# Patient Record
Sex: MALE | Race: OTHER | NOT HISPANIC OR LATINO | URBAN - METROPOLITAN AREA
[De-identification: names, ages, dates, MRNs, and addresses within clinical notes are randomized per-mention and may not be internally consistent; named-entity substitution may affect disease eponyms.]

---

## 2019-02-20 ENCOUNTER — INPATIENT (INPATIENT)
Facility: HOSPITAL | Age: 39
LOS: 4 days | Discharge: HOME | DRG: 432 | End: 2019-02-25
Attending: INTERNAL MEDICINE | Admitting: INTERNAL MEDICINE
Payer: COMMERCIAL

## 2019-02-20 VITALS
OXYGEN SATURATION: 100 % | DIASTOLIC BLOOD PRESSURE: 55 MMHG | TEMPERATURE: 97 F | HEART RATE: 130 BPM | SYSTOLIC BLOOD PRESSURE: 89 MMHG | RESPIRATION RATE: 17 BRPM

## 2019-02-20 LAB
ALBUMIN SERPL ELPH-MCNC: 3.3 G/DL — LOW (ref 3.4–5)
ALP SERPL-CCNC: 45 U/L — SIGNIFICANT CHANGE UP (ref 40–120)
ALT FLD-CCNC: 23 U/L — SIGNIFICANT CHANGE UP (ref 12–42)
ANION GAP SERPL CALC-SCNC: 11 MMOL/L — SIGNIFICANT CHANGE UP (ref 9–16)
APTT BLD: 27.8 SEC — SIGNIFICANT CHANGE UP (ref 27.5–36.3)
AST SERPL-CCNC: 19 U/L — SIGNIFICANT CHANGE UP (ref 15–37)
BASOPHILS NFR BLD AUTO: 0.4 % — SIGNIFICANT CHANGE UP (ref 0–2)
BILIRUB SERPL-MCNC: 1.5 MG/DL — HIGH (ref 0.2–1.2)
BUN SERPL-MCNC: 37 MG/DL — HIGH (ref 7–23)
CALCIUM SERPL-MCNC: 8.5 MG/DL — SIGNIFICANT CHANGE UP (ref 8.5–10.5)
CHLORIDE SERPL-SCNC: 105 MMOL/L — SIGNIFICANT CHANGE UP (ref 96–108)
CO2 SERPL-SCNC: 23 MMOL/L — SIGNIFICANT CHANGE UP (ref 22–31)
CREAT SERPL-MCNC: 0.95 MG/DL — SIGNIFICANT CHANGE UP (ref 0.5–1.3)
EOSINOPHIL NFR BLD AUTO: 2.3 % — SIGNIFICANT CHANGE UP (ref 0–6)
GLUCOSE SERPL-MCNC: 148 MG/DL — HIGH (ref 70–99)
HCT VFR BLD CALC: 35.3 % — LOW (ref 39–50)
HGB BLD-MCNC: 11.7 G/DL — LOW (ref 13–17)
IMM GRANULOCYTES NFR BLD AUTO: 1 % — SIGNIFICANT CHANGE UP (ref 0–1.5)
INR BLD: 1.2 — HIGH (ref 0.88–1.16)
LYMPHOCYTES # BLD AUTO: 11.9 % — LOW (ref 13–44)
MCHC RBC-ENTMCNC: 31.9 PG — SIGNIFICANT CHANGE UP (ref 27–34)
MCHC RBC-ENTMCNC: 33.1 G/DL — SIGNIFICANT CHANGE UP (ref 32–36)
MCV RBC AUTO: 96.2 FL — SIGNIFICANT CHANGE UP (ref 80–100)
MONOCYTES NFR BLD AUTO: 6.7 % — SIGNIFICANT CHANGE UP (ref 2–14)
NEUTROPHILS NFR BLD AUTO: 77.7 % — HIGH (ref 43–77)
PLATELET # BLD AUTO: 141 K/UL — LOW (ref 150–400)
POTASSIUM SERPL-MCNC: 4 MMOL/L — SIGNIFICANT CHANGE UP (ref 3.5–5.3)
POTASSIUM SERPL-SCNC: 4 MMOL/L — SIGNIFICANT CHANGE UP (ref 3.5–5.3)
PROT SERPL-MCNC: 6.2 G/DL — LOW (ref 6.4–8.2)
PROTHROM AB SERPL-ACNC: 13.4 SEC — HIGH (ref 10–12.9)
RBC # BLD: 3.67 M/UL — LOW (ref 4.2–5.8)
RBC # FLD: 13.2 % — SIGNIFICANT CHANGE UP (ref 10.3–14.5)
SODIUM SERPL-SCNC: 139 MMOL/L — SIGNIFICANT CHANGE UP (ref 132–145)
WBC # BLD: 18.3 K/UL — HIGH (ref 3.8–10.5)
WBC # FLD AUTO: 18.3 K/UL — HIGH (ref 3.8–10.5)

## 2019-02-20 PROCEDURE — 71045 X-RAY EXAM CHEST 1 VIEW: CPT | Mod: 26

## 2019-02-20 PROCEDURE — 99285 EMERGENCY DEPT VISIT HI MDM: CPT | Mod: 25

## 2019-02-20 PROCEDURE — 93010 ELECTROCARDIOGRAM REPORT: CPT | Mod: NC

## 2019-02-20 RX ORDER — PANTOPRAZOLE SODIUM 20 MG/1
8 TABLET, DELAYED RELEASE ORAL
Qty: 80 | Refills: 0 | Status: DISCONTINUED | OUTPATIENT
Start: 2019-02-20 | End: 2019-02-20

## 2019-02-20 RX ORDER — SODIUM CHLORIDE 9 MG/ML
1000 INJECTION INTRAMUSCULAR; INTRAVENOUS; SUBCUTANEOUS
Qty: 0 | Refills: 0 | Status: DISCONTINUED | OUTPATIENT
Start: 2019-02-20 | End: 2019-02-21

## 2019-02-20 RX ORDER — SODIUM CHLORIDE 9 MG/ML
2000 INJECTION INTRAMUSCULAR; INTRAVENOUS; SUBCUTANEOUS ONCE
Qty: 0 | Refills: 0 | Status: COMPLETED | OUTPATIENT
Start: 2019-02-20 | End: 2019-02-20

## 2019-02-20 RX ORDER — SODIUM CHLORIDE 9 MG/ML
1000 INJECTION INTRAMUSCULAR; INTRAVENOUS; SUBCUTANEOUS ONCE
Qty: 0 | Refills: 0 | Status: COMPLETED | OUTPATIENT
Start: 2019-02-20 | End: 2019-02-20

## 2019-02-20 RX ORDER — INFLUENZA VIRUS VACCINE 15; 15; 15; 15 UG/.5ML; UG/.5ML; UG/.5ML; UG/.5ML
0.5 SUSPENSION INTRAMUSCULAR ONCE
Qty: 0 | Refills: 0 | Status: COMPLETED | OUTPATIENT
Start: 2019-02-20 | End: 2019-02-25

## 2019-02-20 RX ORDER — PANTOPRAZOLE SODIUM 20 MG/1
80 TABLET, DELAYED RELEASE ORAL ONCE
Qty: 0 | Refills: 0 | Status: COMPLETED | OUTPATIENT
Start: 2019-02-20 | End: 2019-02-20

## 2019-02-20 RX ORDER — ONDANSETRON 8 MG/1
4 TABLET, FILM COATED ORAL ONCE
Qty: 0 | Refills: 0 | Status: COMPLETED | OUTPATIENT
Start: 2019-02-20 | End: 2019-02-20

## 2019-02-20 RX ORDER — PANTOPRAZOLE SODIUM 20 MG/1
40 TABLET, DELAYED RELEASE ORAL EVERY 12 HOURS
Qty: 0 | Refills: 0 | Status: DISCONTINUED | OUTPATIENT
Start: 2019-02-20 | End: 2019-02-25

## 2019-02-20 RX ADMIN — PANTOPRAZOLE SODIUM 10 MG/HR: 20 TABLET, DELAYED RELEASE ORAL at 17:33

## 2019-02-20 RX ADMIN — SODIUM CHLORIDE 1000 MILLILITER(S): 9 INJECTION INTRAMUSCULAR; INTRAVENOUS; SUBCUTANEOUS at 18:00

## 2019-02-20 RX ADMIN — ONDANSETRON 4 MILLIGRAM(S): 8 TABLET, FILM COATED ORAL at 17:30

## 2019-02-20 RX ADMIN — SODIUM CHLORIDE 1000 MILLILITER(S): 9 INJECTION INTRAMUSCULAR; INTRAVENOUS; SUBCUTANEOUS at 17:28

## 2019-02-20 RX ADMIN — SODIUM CHLORIDE 1000 MILLILITER(S): 9 INJECTION INTRAMUSCULAR; INTRAVENOUS; SUBCUTANEOUS at 18:41

## 2019-02-20 RX ADMIN — PANTOPRAZOLE SODIUM 10 MG/HR: 20 TABLET, DELAYED RELEASE ORAL at 19:36

## 2019-02-20 RX ADMIN — PANTOPRAZOLE SODIUM 80 MILLIGRAM(S): 20 TABLET, DELAYED RELEASE ORAL at 17:33

## 2019-02-20 NOTE — H&P ADULT - HISTORY OF PRESENT ILLNESS
39 y/o M w/ no significant PMHx presenting with episode of hematemesis and melena. Patient reports that he was in his usual state of health until this morning when he experienced an episode of nausea with hematemesis x2. Patient reports that shortly thereafter he began having loose dark stools without BRBPR. Given these symptoms patient reports that he presented to [facility]. Patient also reports associated weakness, fatigue and abdominal soreness. Denies any associated shortness of breath, chest pain, fever/chills, dysuria. No sick contacts. No new foods. Reports recent travel to [facility] for work. Of note patient reports that he drank 8-9 alcoholic beverages during the weekend but had no episodes of nuasea/vomiting afterwards.     Upon arrival: T: 97, HR: 130, BP: 89/55, RR: 17, O2: 100% on room air  Labs notable for Hgb of 11.7, WBC of 18.3, BUN 37, Cr 0.95, Bilirubin 1.5  Patient given 2L NS, Zofran 4mg IVP and started on Protonix drip and transferred to [facility]. 37 y/o M w/ no significant PMHx presenting with episode of hematemesis and melena. Patient reports that he was in his usual state of health until this morning when he experienced an episode of nausea with hematemesis x2. Patient reports that shortly thereafter he began having loose dark stools without BRBPR. Given these symptoms patient reports that he presented to [facility]. Patient also reports associated weakness, fatigue and abdominal soreness. Denies any associated shortness of breath, chest pain, fever/chills, dysuria. No sick contacts. No new foods. Reports recent travel to [facility] for work. Of note patient reports that he drank 8-9 alcoholic beverages during the weekend but had no episodes of nuasea/vomiting afterwards.     Upon arrival: T: 97, HR: 130, BP: 89/55, RR: 17, O2: 100% on room air  Labs notable for Hgb of 11.7, WBC of 18.3, BUN 37, Cr 0.95, Bilirubin 1.5  Patient given 4L NS, Zofran 4mg IVP and started on Protonix drip and transferred to [facility].

## 2019-02-20 NOTE — H&P ADULT - ATTENDING COMMENTS
Patient examined this AM and found to be hypotensive with SBP in 80s, HR up to 160 with sinus tachycardia, pale with hemoglobin down to 6.7. Resuscitated with 2 units of PRBC and 2 liters of RL. GI consulted emergently for EGD; discussed with anesthesiology. During the transfusion he developed a hive on left shoulder and one mid-back so he was given a dose of benadryl 25 mg and transfusion continued, well tolerated.  Critical care time rendered 50 minutes

## 2019-02-20 NOTE — H&P ADULT - PROBLEM SELECTOR PLAN 1
Patient presenting w/ episode of hematemesis x2 and melena. Hgb of 11.7 upon admission and patient s/p 2L NS. Concern for upper GI bleed given pt also with elevated BUN of 37 upon admission. Orthostatics negative upon arrival.   -F/u repeat CBC  -Will keep NPO after midnight for potential GI intervention in AM  -GI consult in AM  -Will obtain repeat orthostatics   -C/w NS @100cc/hr  -Protonix 40mg IV BID   -Maintain active type and screen, two large bore IVs

## 2019-02-20 NOTE — H&P ADULT - NSHPLABSRESULTS_GEN_ALL_CORE
.  LABS:                         11.7   18.3  )-----------( 141      ( 20 Feb 2019 17:22 )             35.3     02-20    139  |  105  |  37<H>  ----------------------------<  148<H>  4.0   |  23  |  0.95    Ca    8.5      20 Feb 2019 17:22    TPro  6.2<L>  /  Alb  3.3<L>  /  TBili  1.5<H>  /  DBili  x   /  AST  19  /  ALT  23  /  AlkPhos  45  02-20    PT/INR - ( 20 Feb 2019 17:22 )   PT: 13.4 sec;   INR: 1.20          PTT - ( 20 Feb 2019 17:22 )  PTT:27.8 sec              RADIOLOGY, EKG & ADDITIONAL TESTS: Reviewed. LABS:              11.7   18.3  )-----------( 141      ( 20 Feb 2019 17:22 )             35.3     02-20  139  |  105  |  37<H>  ----------------------------<  148<H>  4.0   |  23  |  0.95    Ca    8.5      20 Feb 2019 17:22    TPro  6.2<L>  /  Alb  3.3<L>  /  TBili  1.5<H>  /  DBili  x   /  AST  19  /  ALT  23  /  AlkPhos  45  02-20    PT/INR - ( 20 Feb 2019 17:22 )   PT: 13.4 sec;   INR: 1.20     PTT - ( 20 Feb 2019 17:22 )  PTT:27.8 sec    RADIOLOGY, EKG & ADDITIONAL TESTS: Reviewed.

## 2019-02-20 NOTE — ED PROVIDER NOTE - CONSTITUTIONAL, MLM
normal... Appears pale, awake, alert, oriented to person, place, time/situation and in no apparent distress.

## 2019-02-20 NOTE — ED PROVIDER NOTE - CLINICAL SUMMARY MEDICAL DECISION MAKING FREE TEXT BOX
37 y/o male with upper GI bleed. Blood pressure improved with fluids, CBC stable but likely to drop. Accepting for admission to [facility] medicine for emergency endoscopy, accepted for admission by Dr. De La Rosa.

## 2019-02-20 NOTE — ED PROVIDER NOTE - OBJECTIVE STATEMENT
37 y/o male with no PMHx, NKDA, presents to ED accompanied by his girlfriend with c/o sudden onset of bloody emesis this morning. Pt states he woke up early this morning and began feeling nausea and abd pain before having several episodes of vomiting that appeared bloody (dark red). His girlfriend states he had a few liquid-like bowel movements that appeared dark. Upon arrival in ED, he has vomited at least a cup into the emesis bag and it appears to be dark. Pt appears pale but is awake and alert.

## 2019-02-20 NOTE — H&P ADULT - ASSESSMENT
37 y/o M w/ no significant PMHx presenting w/ episode of hematemesis and melena admitted to [facility]. 37 y/o M w/ no significant PMHx presenting w/ episode of hematemesis and melena admitted to [facility] for acute blood loss anemia secondary to upper GI bleed.

## 2019-02-20 NOTE — ED ADULT NURSE NOTE - OBJECTIVE STATEMENT
38 y.o M presents to ED with c/o sudden onset dark bloody nausea/vomiting blood since 2am. Pt reports waking up in the morning when he began to feel nausea and generalized abdominal pain. Pt also reports few episodes of liquid dark stools. As per pts girlfriend, pt is more pale than usual. Pt denies CP, SOB, fever, chills.

## 2019-02-20 NOTE — ED ADULT TRIAGE NOTE - CHIEF COMPLAINT QUOTE
c/o abd pain with vomitting and diarrhea since last night. . as per pt, stool is black and vomit has brb in it abpout 5 epsidoes each. . pt pale.

## 2019-02-20 NOTE — H&P ADULT - PROBLEM SELECTOR PLAN 2
Patient w/ WBC of 18 upon admission. Low concern for active infection. Patient afebrile. Likely reactive in the setting of GI bleed.   -If concern for active infection will obtain blood cultures, RVP and UA and urine culture

## 2019-02-20 NOTE — ED PROVIDER NOTE - PROGRESS NOTE DETAILS
Called transfer center and ICU consult Dr. De La Rosa. Requested for us to call back with CBC results. Called transfer center and ICU consult Dr. De La Rosa. Requested for us to call back with CBC results. accepted by dr de la rosa for admission to stepdown. not orthostatic, pending transport, med error noted and addressed, informed patient of error and unlikely long term side effect of protonix.  will restart drip [facility] and [facility] as well. Posion control consulted by pharmacy, see pharmacy note for details. Recc no other intervention at this time.

## 2019-02-20 NOTE — ED ADULT NURSE REASSESSMENT NOTE - NS ED NURSE REASSESS COMMENT FT1
Patient is sitting up in stretcher in no acute distress. Pending clean bed status for transport. Pt made aware of delay and verbalized understanding. IV Protonix infusing through pump as ordered, IV site benign. Pt denies any HA, N/V, dizziness, CP or SOB. Maintained on cardiac monitor, NSR. VSS. Safety and comfort measures maintained, call bell within reach, girlfriend at bedside, will continue to monitor.

## 2019-02-20 NOTE — ED ADULT NURSE REASSESSMENT NOTE - NS ED NURSE REASSESS COMMENT FT1
Report given to EMS at patient bedside. Pt in no acute distress, denies any N/V, HA, dizziness, CP or SOB. IV Protonix infusing as ordered, tolerating well, site benign.

## 2019-02-20 NOTE — H&P ADULT - NSHPPHYSICALEXAM_GEN_ALL_CORE
.  VITAL SIGNS:  T(C): 37.4 (02-20-19 @ 22:03), Max: 37.4 (02-20-19 @ 22:03)  T(F): 99.4 (02-20-19 @ 22:03), Max: 99.4 (02-20-19 @ 22:03)  HR: 101 (02-20-19 @ 23:31) (90 - 130)  BP: 97/56 (02-20-19 @ 23:31) (89/55 - 101/67)  BP(mean): --  RR: 16 (02-20-19 @ 23:31) (16 - 18)  SpO2: 100% (02-20-19 @ 23:31) (99% - 100%)  Wt(kg): --    PHYSICAL EXAM:    Constitutional: WDWN resting comfortably in bed; NAD  Head: NC/AT  Eyes: PERRL, EOMI, anicteric sclera  ENT: no nasal discharge; uvula midline, no oropharyngeal erythema or exudates; MMM  Neck: supple; no JVD or thyromegaly  Respiratory: CTA B/L; no W/R/R, no retractions  Cardiac: +S1/S2; RRR; no M/R/G; PMI non-displaced  Gastrointestinal: abdomen soft, NT/ND; no rebound or guarding; +BSx4  Genitourinary: normal external genitalia  Back: spine midline, no bony tenderness or step-offs; no CVAT B/L  Extremities: WWP, no clubbing or cyanosis; no peripheral edema  Musculoskeletal: NROM x4; no joint swelling, tenderness or erythema  Vascular: 2+ radial, femoral, DP/PT pulses B/L  Dermatologic: skin warm, dry and intact; no rashes, wounds, or scars  Lymphatic: no submandibular or cervical LAD  Neurologic: AAOx3; CNII-XII grossly intact; no focal deficits  Psychiatric: affect and characteristics of appearance, verbalizations, behaviors are appropriate .  VITAL SIGNS:  T(C): 37.4 (02-20-19 @ 22:03), Max: 37.4 (02-20-19 @ 22:03)  T(F): 99.4 (02-20-19 @ 22:03), Max: 99.4 (02-20-19 @ 22:03)  HR: 101 (02-20-19 @ 23:31) (90 - 130)  BP: 97/56 (02-20-19 @ 23:31) (89/55 - 101/67)  BP(mean): --  RR: 16 (02-20-19 @ 23:31) (16 - 18)  SpO2: 100% (02-20-19 @ 23:31) (99% - 100%)  Wt(kg): --    PHYSICAL EXAM:    Constitutional: Young  male sitting in bed comfortably  Head: NC/AT  Eyes: PERRL, EOMI, pale conjunctiva   ENT: no nasal discharge; uvula midline, dry mucous membranes, good dentition, no oral ulcers  Neck: supple; no JVD or thyromegaly  Respiratory: CTA B/L, breathing comfortably on room air, no accessory muscle use. No wheezes/crackles/rhonchi  Cardiac: +S1/S2; RRR; no M/R/G; PMI non-displaced  Gastrointestinal: Soft, non-tender, non-distended, normal bowel sounds   Extremities: WWP, no clubbing or cyanosis; no peripheral edema  Musculoskeletal: NROM x4; no joint swelling, tenderness or erythema  Vascular: 2+ DP pulses   Dermatologic: skin warm, dry and intact  Neurologic: AAOx3; CNII-XII grossly intact; no focal deficits VITAL SIGNS:  T(C): 37.4 (02-20-19 @ 22:03), Max: 37.4 (02-20-19 @ 22:03)  T(F): 99.4 (02-20-19 @ 22:03), Max: 99.4 (02-20-19 @ 22:03)  HR: 101 (02-20-19 @ 23:31) (90 - 130)  BP: 97/56 (02-20-19 @ 23:31) (89/55 - 101/67)  RR: 16 (02-20-19 @ 23:31) (16 - 18)  SpO2: 100% (02-20-19 @ 23:31) (99% - 100%)    PHYSICAL EXAM:  Constitutional: Young  male sitting in bed comfortably  Head: NC/AT  Eyes: PERRL, EOMI, pale conjunctiva   ENT: no nasal discharge; uvula midline, dry mucous membranes, good dentition, no oral ulcers  Neck: supple; no JVD or thyromegaly  Respiratory: CTA B/L, breathing comfortably on room air, no accessory muscle use. No wheezes/crackles/rhonchi  Cardiac: +S1/S2; RRR; no M/R/G; PMI non-displaced  Gastrointestinal: Soft, non-tender, non-distended, normal bowel sounds   Extremities: WWP, no clubbing or cyanosis; no peripheral edema  Musculoskeletal: NROM x4; no joint swelling, tenderness or erythema  Vascular: 2+ DP pulses   Dermatologic: skin warm, dry and intact  Neurologic: AAOx3; CNII-XII grossly intact; no focal deficits

## 2019-02-20 NOTE — ED ADULT NURSE REASSESSMENT NOTE - NS ED NURSE REASSESS COMMENT FT1
Patient notified of medication error. Intended order for Pantoprazole 80mg IVPB @10mL/hr was actually infused @ 100mL/hr. Pt denies any headache, nausea, vomiting dizziness, CP, SOB. Attending MD and pharmacist immediately made aware. As per Pharmacist and poison control pt will endure no harm from medication error. As per pharmacy, pt will require additional dosage at original intended rate for therapeutic effect. Pt verbalized understanding of side effects and error made.

## 2019-02-21 DIAGNOSIS — D72.829 ELEVATED WHITE BLOOD CELL COUNT, UNSPECIFIED: ICD-10-CM

## 2019-02-21 DIAGNOSIS — Z78.9 OTHER SPECIFIED HEALTH STATUS: ICD-10-CM

## 2019-02-21 DIAGNOSIS — I85.01 ESOPHAGEAL VARICES WITH BLEEDING: ICD-10-CM

## 2019-02-21 DIAGNOSIS — K92.2 GASTROINTESTINAL HEMORRHAGE, UNSPECIFIED: ICD-10-CM

## 2019-02-21 DIAGNOSIS — Z91.89 OTHER SPECIFIED PERSONAL RISK FACTORS, NOT ELSEWHERE CLASSIFIED: ICD-10-CM

## 2019-02-21 DIAGNOSIS — Z29.9 ENCOUNTER FOR PROPHYLACTIC MEASURES, UNSPECIFIED: ICD-10-CM

## 2019-02-21 DIAGNOSIS — R63.8 OTHER SYMPTOMS AND SIGNS CONCERNING FOOD AND FLUID INTAKE: ICD-10-CM

## 2019-02-21 LAB
ALBUMIN SERPL ELPH-MCNC: 2.6 G/DL — LOW (ref 3.3–5)
ALP SERPL-CCNC: 23 U/L — LOW (ref 40–120)
ALT FLD-CCNC: 11 U/L — SIGNIFICANT CHANGE UP (ref 10–45)
ANION GAP SERPL CALC-SCNC: 8 MMOL/L — SIGNIFICANT CHANGE UP (ref 5–17)
ANION GAP SERPL CALC-SCNC: 9 MMOL/L — SIGNIFICANT CHANGE UP (ref 5–17)
APTT BLD: 28.2 SEC — SIGNIFICANT CHANGE UP (ref 27.5–36.3)
APTT BLD: 29.2 SEC — SIGNIFICANT CHANGE UP (ref 27.5–36.3)
AST SERPL-CCNC: 11 U/L — SIGNIFICANT CHANGE UP (ref 10–40)
BASOPHILS # BLD AUTO: 0.01 K/UL — SIGNIFICANT CHANGE UP (ref 0–0.2)
BASOPHILS NFR BLD AUTO: 0.1 % — SIGNIFICANT CHANGE UP (ref 0–2)
BILIRUB SERPL-MCNC: 0.7 MG/DL — SIGNIFICANT CHANGE UP (ref 0.2–1.2)
BLD GP AB SCN SERPL QL: NEGATIVE — SIGNIFICANT CHANGE UP
BLD GP AB SCN SERPL QL: NEGATIVE — SIGNIFICANT CHANGE UP
BUN SERPL-MCNC: 27 MG/DL — HIGH (ref 7–23)
BUN SERPL-MCNC: 29 MG/DL — HIGH (ref 7–23)
CALCIUM SERPL-MCNC: 7.2 MG/DL — LOW (ref 8.4–10.5)
CALCIUM SERPL-MCNC: 7.4 MG/DL — LOW (ref 8.4–10.5)
CHLORIDE SERPL-SCNC: 113 MMOL/L — HIGH (ref 96–108)
CHLORIDE SERPL-SCNC: 114 MMOL/L — HIGH (ref 96–108)
CO2 SERPL-SCNC: 21 MMOL/L — LOW (ref 22–31)
CO2 SERPL-SCNC: 21 MMOL/L — LOW (ref 22–31)
CREAT SERPL-MCNC: 0.68 MG/DL — SIGNIFICANT CHANGE UP (ref 0.5–1.3)
CREAT SERPL-MCNC: 0.68 MG/DL — SIGNIFICANT CHANGE UP (ref 0.5–1.3)
EOSINOPHIL # BLD AUTO: 0.03 K/UL — SIGNIFICANT CHANGE UP (ref 0–0.5)
EOSINOPHIL NFR BLD AUTO: 0.3 % — SIGNIFICANT CHANGE UP (ref 0–6)
FIBRINOGEN PPP-MCNC: 171 MG/DL — LOW (ref 258–438)
GLUCOSE SERPL-MCNC: 124 MG/DL — HIGH (ref 70–99)
GLUCOSE SERPL-MCNC: 146 MG/DL — HIGH (ref 70–99)
HAV IGM SER-ACNC: SIGNIFICANT CHANGE UP
HBV CORE AB SER-ACNC: SIGNIFICANT CHANGE UP
HBV CORE IGM SER-ACNC: SIGNIFICANT CHANGE UP
HBV SURFACE AG SER-ACNC: SIGNIFICANT CHANGE UP
HCT VFR BLD CALC: 18.6 % — CRITICAL LOW (ref 39–50)
HCT VFR BLD CALC: 21.1 % — LOW (ref 39–50)
HCT VFR BLD CALC: 22.3 % — LOW (ref 39–50)
HCT VFR BLD CALC: 24.3 % — LOW (ref 39–50)
HCT VFR BLD CALC: 25.1 % — LOW (ref 39–50)
HCV AB S/CO SERPL IA: 0.07 S/CO — SIGNIFICANT CHANGE UP
HCV AB SERPL-IMP: SIGNIFICANT CHANGE UP
HGB BLD-MCNC: 6.3 G/DL — CRITICAL LOW (ref 13–17)
HGB BLD-MCNC: 7.1 G/DL — LOW (ref 13–17)
HGB BLD-MCNC: 7.6 G/DL — LOW (ref 13–17)
HGB BLD-MCNC: 8.2 G/DL — LOW (ref 13–17)
HGB BLD-MCNC: 8.5 G/DL — LOW (ref 13–17)
HIV 1+2 AB+HIV1 P24 AG SERPL QL IA: SIGNIFICANT CHANGE UP
IMM GRANULOCYTES NFR BLD AUTO: 0.7 % — SIGNIFICANT CHANGE UP (ref 0–1.5)
INR BLD: 1.21 — HIGH (ref 0.88–1.16)
INR BLD: 1.25 — HIGH (ref 0.88–1.16)
LYMPHOCYTES # BLD AUTO: 1.52 K/UL — SIGNIFICANT CHANGE UP (ref 1–3.3)
LYMPHOCYTES # BLD AUTO: 16.6 % — SIGNIFICANT CHANGE UP (ref 13–44)
MAGNESIUM SERPL-MCNC: 1.5 MG/DL — LOW (ref 1.6–2.6)
MCHC RBC-ENTMCNC: 31.5 PG — SIGNIFICANT CHANGE UP (ref 27–34)
MCHC RBC-ENTMCNC: 31.5 PG — SIGNIFICANT CHANGE UP (ref 27–34)
MCHC RBC-ENTMCNC: 32.3 PG — SIGNIFICANT CHANGE UP (ref 27–34)
MCHC RBC-ENTMCNC: 33.3 PG — SIGNIFICANT CHANGE UP (ref 27–34)
MCHC RBC-ENTMCNC: 33.6 GM/DL — SIGNIFICANT CHANGE UP (ref 32–36)
MCHC RBC-ENTMCNC: 33.7 GM/DL — SIGNIFICANT CHANGE UP (ref 32–36)
MCHC RBC-ENTMCNC: 33.7 PG — SIGNIFICANT CHANGE UP (ref 27–34)
MCHC RBC-ENTMCNC: 33.9 GM/DL — SIGNIFICANT CHANGE UP (ref 32–36)
MCHC RBC-ENTMCNC: 33.9 GM/DL — SIGNIFICANT CHANGE UP (ref 32–36)
MCHC RBC-ENTMCNC: 34.1 GM/DL — SIGNIFICANT CHANGE UP (ref 32–36)
MCV RBC AUTO: 92.5 FL — SIGNIFICANT CHANGE UP (ref 80–100)
MCV RBC AUTO: 93.5 FL — SIGNIFICANT CHANGE UP (ref 80–100)
MCV RBC AUTO: 95.9 FL — SIGNIFICANT CHANGE UP (ref 80–100)
MCV RBC AUTO: 98.4 FL — SIGNIFICANT CHANGE UP (ref 80–100)
MCV RBC AUTO: 99.5 FL — SIGNIFICANT CHANGE UP (ref 80–100)
MONOCYTES # BLD AUTO: 0.6 K/UL — SIGNIFICANT CHANGE UP (ref 0–0.9)
MONOCYTES NFR BLD AUTO: 6.5 % — SIGNIFICANT CHANGE UP (ref 2–14)
NEUTROPHILS # BLD AUTO: 6.96 K/UL — SIGNIFICANT CHANGE UP (ref 1.8–7.4)
NEUTROPHILS NFR BLD AUTO: 75.8 % — SIGNIFICANT CHANGE UP (ref 43–77)
NRBC # BLD: 0 /100 WBCS — SIGNIFICANT CHANGE UP (ref 0–0)
PLATELET # BLD AUTO: 74 K/UL — LOW (ref 150–400)
PLATELET # BLD AUTO: 75 K/UL — LOW (ref 150–400)
PLATELET # BLD AUTO: 84 K/UL — LOW (ref 150–400)
PLATELET # BLD AUTO: 86 K/UL — LOW (ref 150–400)
PLATELET # BLD AUTO: 95 K/UL — LOW (ref 150–400)
POTASSIUM SERPL-MCNC: 3.6 MMOL/L — SIGNIFICANT CHANGE UP (ref 3.5–5.3)
POTASSIUM SERPL-MCNC: 3.8 MMOL/L — SIGNIFICANT CHANGE UP (ref 3.5–5.3)
POTASSIUM SERPL-SCNC: 3.6 MMOL/L — SIGNIFICANT CHANGE UP (ref 3.5–5.3)
POTASSIUM SERPL-SCNC: 3.8 MMOL/L — SIGNIFICANT CHANGE UP (ref 3.5–5.3)
PROT SERPL-MCNC: 4.2 G/DL — LOW (ref 6–8.3)
PROTHROM AB SERPL-ACNC: 13.7 SEC — HIGH (ref 10–12.9)
PROTHROM AB SERPL-ACNC: 14.2 SEC — HIGH (ref 10–12.9)
RBC # BLD: 1.87 M/UL — LOW (ref 4.2–5.8)
RBC # BLD: 2.2 M/UL — LOW (ref 4.2–5.8)
RBC # BLD: 2.41 M/UL — LOW (ref 4.2–5.8)
RBC # BLD: 2.55 M/UL — LOW (ref 4.2–5.8)
RBC # BLD: 2.6 M/UL — LOW (ref 4.2–5.8)
RBC # FLD: 13.2 % — SIGNIFICANT CHANGE UP (ref 10.3–14.5)
RBC # FLD: 13.7 % — SIGNIFICANT CHANGE UP (ref 10.3–14.5)
RBC # FLD: 15 % — HIGH (ref 10.3–14.5)
RBC # FLD: 16.1 % — HIGH (ref 10.3–14.5)
RBC # FLD: 16.5 % — HIGH (ref 10.3–14.5)
RH IG SCN BLD-IMP: POSITIVE — SIGNIFICANT CHANGE UP
RH IG SCN BLD-IMP: POSITIVE — SIGNIFICANT CHANGE UP
SODIUM SERPL-SCNC: 143 MMOL/L — SIGNIFICANT CHANGE UP (ref 135–145)
SODIUM SERPL-SCNC: 143 MMOL/L — SIGNIFICANT CHANGE UP (ref 135–145)
WBC # BLD: 10.45 K/UL — SIGNIFICANT CHANGE UP (ref 3.8–10.5)
WBC # BLD: 11.05 K/UL — HIGH (ref 3.8–10.5)
WBC # BLD: 11.31 K/UL — HIGH (ref 3.8–10.5)
WBC # BLD: 12.7 K/UL — HIGH (ref 3.8–10.5)
WBC # BLD: 9.18 K/UL — SIGNIFICANT CHANGE UP (ref 3.8–10.5)
WBC # FLD AUTO: 10.45 K/UL — SIGNIFICANT CHANGE UP (ref 3.8–10.5)
WBC # FLD AUTO: 11.05 K/UL — HIGH (ref 3.8–10.5)
WBC # FLD AUTO: 11.31 K/UL — HIGH (ref 3.8–10.5)
WBC # FLD AUTO: 12.7 K/UL — HIGH (ref 3.8–10.5)
WBC # FLD AUTO: 9.18 K/UL — SIGNIFICANT CHANGE UP (ref 3.8–10.5)

## 2019-02-21 PROCEDURE — 99291 CRITICAL CARE FIRST HOUR: CPT

## 2019-02-21 PROCEDURE — 71045 X-RAY EXAM CHEST 1 VIEW: CPT | Mod: 26

## 2019-02-21 RX ORDER — OCTREOTIDE ACETATE 200 UG/ML
40 INJECTION, SOLUTION INTRAVENOUS; SUBCUTANEOUS
Qty: 500 | Refills: 0 | Status: DISCONTINUED | OUTPATIENT
Start: 2019-02-21 | End: 2019-02-25

## 2019-02-21 RX ORDER — SODIUM CHLORIDE 9 MG/ML
1000 INJECTION, SOLUTION INTRAVENOUS
Qty: 0 | Refills: 0 | Status: DISCONTINUED | OUTPATIENT
Start: 2019-02-21 | End: 2019-02-22

## 2019-02-21 RX ORDER — SODIUM CHLORIDE 9 MG/ML
1000 INJECTION, SOLUTION INTRAVENOUS
Qty: 0 | Refills: 0 | Status: DISCONTINUED | OUTPATIENT
Start: 2019-02-21 | End: 2019-02-23

## 2019-02-21 RX ORDER — ACETAMINOPHEN 500 MG
650 TABLET ORAL ONCE
Qty: 0 | Refills: 0 | Status: COMPLETED | OUTPATIENT
Start: 2019-02-21 | End: 2019-02-21

## 2019-02-21 RX ORDER — OCTREOTIDE ACETATE 200 UG/ML
40 INJECTION, SOLUTION INTRAVENOUS; SUBCUTANEOUS ONCE
Qty: 0 | Refills: 0 | Status: COMPLETED | OUTPATIENT
Start: 2019-02-21 | End: 2019-02-21

## 2019-02-21 RX ORDER — MAGNESIUM SULFATE 500 MG/ML
4 VIAL (ML) INJECTION ONCE
Qty: 0 | Refills: 0 | Status: COMPLETED | OUTPATIENT
Start: 2019-02-21 | End: 2019-02-21

## 2019-02-21 RX ORDER — CEFTRIAXONE 500 MG/1
1 INJECTION, POWDER, FOR SOLUTION INTRAMUSCULAR; INTRAVENOUS EVERY 24 HOURS
Qty: 0 | Refills: 0 | Status: DISCONTINUED | OUTPATIENT
Start: 2019-02-21 | End: 2019-02-25

## 2019-02-21 RX ORDER — MAGNESIUM SULFATE 500 MG/ML
2 VIAL (ML) INJECTION ONCE
Qty: 0 | Refills: 0 | Status: DISCONTINUED | OUTPATIENT
Start: 2019-02-21 | End: 2019-02-21

## 2019-02-21 RX ORDER — DIPHENHYDRAMINE HCL 50 MG
25 CAPSULE ORAL ONCE
Qty: 0 | Refills: 0 | Status: COMPLETED | OUTPATIENT
Start: 2019-02-21 | End: 2019-02-21

## 2019-02-21 RX ORDER — POTASSIUM CHLORIDE 20 MEQ
20 PACKET (EA) ORAL ONCE
Qty: 0 | Refills: 0 | Status: COMPLETED | OUTPATIENT
Start: 2019-02-21 | End: 2019-02-21

## 2019-02-21 RX ORDER — SODIUM CHLORIDE 9 MG/ML
1000 INJECTION, SOLUTION INTRAVENOUS
Qty: 0 | Refills: 0 | Status: DISCONTINUED | OUTPATIENT
Start: 2019-02-21 | End: 2019-02-21

## 2019-02-21 RX ADMIN — SODIUM CHLORIDE 100 MILLILITER(S): 9 INJECTION, SOLUTION INTRAVENOUS at 10:44

## 2019-02-21 RX ADMIN — SODIUM CHLORIDE 100 MILLILITER(S): 9 INJECTION INTRAMUSCULAR; INTRAVENOUS; SUBCUTANEOUS at 00:26

## 2019-02-21 RX ADMIN — OCTREOTIDE ACETATE 40 MICROGRAM(S): 200 INJECTION, SOLUTION INTRAVENOUS; SUBCUTANEOUS at 15:31

## 2019-02-21 RX ADMIN — SODIUM CHLORIDE 100 MILLILITER(S): 9 INJECTION, SOLUTION INTRAVENOUS at 07:30

## 2019-02-21 RX ADMIN — CEFTRIAXONE 100 GRAM(S): 500 INJECTION, POWDER, FOR SOLUTION INTRAMUSCULAR; INTRAVENOUS at 15:21

## 2019-02-21 RX ADMIN — Medication 25 MILLIGRAM(S): at 10:43

## 2019-02-21 RX ADMIN — OCTREOTIDE ACETATE 8 MICROGRAM(S)/HR: 200 INJECTION, SOLUTION INTRAVENOUS; SUBCUTANEOUS at 15:33

## 2019-02-21 RX ADMIN — Medication 650 MILLIGRAM(S): at 10:43

## 2019-02-21 RX ADMIN — PANTOPRAZOLE SODIUM 40 MILLIGRAM(S): 20 TABLET, DELAYED RELEASE ORAL at 06:01

## 2019-02-21 RX ADMIN — SODIUM CHLORIDE 100 MILLILITER(S): 9 INJECTION, SOLUTION INTRAVENOUS at 19:15

## 2019-02-21 RX ADMIN — ONDANSETRON 4 MILLIGRAM(S): 8 TABLET, FILM COATED ORAL at 00:26

## 2019-02-21 RX ADMIN — Medication 100 GRAM(S): at 07:39

## 2019-02-21 RX ADMIN — PANTOPRAZOLE SODIUM 40 MILLIGRAM(S): 20 TABLET, DELAYED RELEASE ORAL at 18:08

## 2019-02-21 NOTE — PROGRESS NOTE ADULT - PROBLEM SELECTOR PLAN 3
-patient has a history of drinking 8-9 drinks on the weekends  -current CIWA 2, will continue with CIWA protocol for now  -ativan 2 mg IV if CIWA >8  -multivitamins/folate/thiamine IV

## 2019-02-21 NOTE — PROGRESS NOTE ADULT - ASSESSMENT
39 y/o M w/ no significant PMHx presenting w/ episode of hematemesis and melena admitted to [facility] for acute blood loss anemia secondary to upper GI bleed. 39 y/o M w/ no significant PMHx presenting w/ episode of hematemesis and melena admitted [facility] for acute blood loss anemia secondary to upper GI bleed with esophageal varices.

## 2019-02-21 NOTE — CONSULT NOTE ADULT - ASSESSMENT
38 year old male with hematemesis and melena with symptomatic anemia noted to have drop in hgb concern for UGIB.     #UGIB   -NPO  -2 large bore IVs  -Hgb>7 - please transfuse 2 units prbs   -Plts > 50k, transfuse as needed  -IV PPI BID   -Plan for EGD today  -If pt  becomes HD unstable, please transfer pt to MICU for bedside endoscopy    Case to be d/w Dr. Hudson     GI following closely

## 2019-02-21 NOTE — CONSULT NOTE ADULT - SUBJECTIVE AND OBJECTIVE BOX
HPI:    37 y/o M w/ no significant PMHx presenting with episode of hematemesis and melena. Patient reports that he was in his usual state of health until this morning when he experienced an episode of nausea with hematemesis x2. Patient reports that shortly thereafter he began having loose dark stools without BRBPR.  Patient also reports associated weakness, fatigue and abdominal soreness. Denies any associated shortness of breath, chest pain, fever/chills, dysuria. No sick contacts. No new foods. Reports recent travel to [facility] for work. Of note patient reports that he drank 8-9 alcoholic beverages during the weekend but had no episodes of nuasea/vomiting afterwards.         PAST MEDICAL & SURGICAL HISTORY:  No pertinent past medical history  No significant past surgical history      MEDICATIONS  (STANDING):  influenza   Vaccine 0.5 milliLiter(s) IntraMuscular once  lactated ringers. 1000 milliLiter(s) (100 mL/Hr) IV Continuous <Continuous>  pantoprazole  Injectable 40 milliGRAM(s) IV Push every 12 hours  potassium chloride   Powder 20 milliEquivalent(s) Oral once    MEDICATIONS  (PRN):      Allergies    No Known Allergies    Intolerances        SOCIAL HISTORY:    FAMILY HISTORY:      Vital Signs Last 24 Hrs  T(C): 37.4 (21 Feb 2019 09:47), Max: 37.4 (20 Feb 2019 22:03)  T(F): 99.3 (21 Feb 2019 09:47), Max: 99.4 (20 Feb 2019 22:03)  HR: 114 (21 Feb 2019 08:55) (90 - 130)  BP: 102/51 (21 Feb 2019 08:55) (89/55 - 102/51)  BP(mean): 71 (21 Feb 2019 08:55) (65 - 71)  RR: 16 (21 Feb 2019 00:57) (16 - 18)  SpO2: 100% (21 Feb 2019 08:55) (99% - 100%)    PHYSICAL EXAM:    GEN: NAD, AOx3  HEENT: anicteric  CHEST: no w/r/r  CVS: rapid rate, reg rhythm   ABD: soft, nt, nd   RECTAL: dark stool noted in the bowel         LABS:                        6.3    9.18  )-----------( 74       ( 21 Feb 2019 06:47 )             18.6     02-21    143  |  114<H>  |  27<H>  ----------------------------<  146<H>  3.8   |  21<L>  |  0.68    Ca    7.2<L>      21 Feb 2019 06:47  Mg     1.5     02-21    TPro  4.2<L>  /  Alb  2.6<L>  /  TBili  0.7  /  DBili  x   /  AST  11  /  ALT  11  /  AlkPhos  23<L>  02-21    PT/INR - ( 20 Feb 2019 23:55 )   PT: 14.2 sec;   INR: 1.25          PTT - ( 20 Feb 2019 23:55 )  PTT:28.2 sec      RADIOLOGY & ADDITIONAL STUDIES:

## 2019-02-21 NOTE — PROGRESS NOTE ADULT - PROBLEM SELECTOR PLAN 1
-Patient presenting w/ episode of hematemesis x2 and melena. Found to have hemoglobin of 6.3 in the am  -EGD showed esophageal varices, with post 5 clips  -patient had a total of 4 units of pRBC and 5 L NS  -octreotide at infusion rate of 40 micrograms/hour  -protonix 40 mg iv BID  -abdominal ultrasound with doppler  -CBC Q12  -autoimmune panel for hepatitis  -hepatitis C and B negative  -HIV negative  -C/w LR @100cc/hr  -Maintain active type and screen, two large bore IVs  -if deteriorates HD, stat GI consult -Patient presenting w/ episode of hematemesis x2 and melena. Found to have hemoglobin of 6.3 in the am  -EGD showed esophageal varices, with post 5 clips  -patient had a total of 4 units of pRBC and 5 L NS  -octreotide at infusion rate of 40 micrograms/hour  -protonix 40 mg iv BID  -will start w/u for occult liver disease: abdominal ultrasound with doppler  -CBC Q12  -autoimmune panel for hepatitis  -hepatitis C and B negative  -HIV negative  -C/w LR @100cc/hr  -Maintain active type and screen, two large bore IVs  -if deteriorates HD, stat GI consult

## 2019-02-21 NOTE — PROGRESS NOTE ADULT - PROBLEM SELECTOR PLAN 2
-Patient w/ WBC of 18 upon admission. Low concern for active infection. Patient afebrile. Likely reactive in the setting of GI bleed.   -WBC currently 12.7 continue to monitor.   -If concern for active infection will obtain blood cultures, RVP and UA and urine culture

## 2019-02-21 NOTE — PROGRESS NOTE ADULT - SUBJECTIVE AND OBJECTIVE BOX
Interval history: In the morning patient was found to be tachycardic to the 140s and hypotensive systolic 80s/50s. He was given 2 units of PRBC under pressure with a fluid bolus of 1 L and tachycardia improved from 160s to 110s. Patient was found to have hives from blood transfusion, with complete resolution with 25 mg IV benadryl and tylenol. EGD showed multiple esophageal varices post clipping. He had another episode of bleeding during procedure and was given 5 L NS total and 4 units pRBC total. Hg in the morning was 6.3 and this improved to 8.2 at 5 pm. Denies fevers or chills         PHYSICAL EXAM:  	Constitutional: Young  male sitting in bed comfortably  	Head: NC/AT  	Eyes: PERRL, EOMI, pale conjunctiva   	ENT: no nasal discharge; uvula midline, dry mucous membranes, good dentition, no oral ulcers  	Neck: supple; no JVD or thyromegaly  	Respiratory: CTA B/L, breathing comfortably on room air, no accessory muscle use. No wheezes/crackles/rhonchi  	Cardiac: +S1/S2; tachycardic to 110s.  	Gastrointestinal: Soft, non-tender, non-distended, normal bowel sounds   	Extremities: WWP, no clubbing or cyanosis; no peripheral edema  	Musculoskeletal: NROM x4; no joint swelling, tenderness or erythema  	Vascular: 2+ DP pulses   	Dermatologic: skin warm, dry and intact  Neurologic: AAOx3; CNII-XII grossly intact; no focal deficits      LABS:                         8.2    12.70 )-----------( 84       ( 21 Feb 2019 17:50 )             24.3     02-21    143  |  114<H>  |  27<H>  ----------------------------<  146<H>  3.8   |  21<L>  |  0.68    Ca    7.2<L>      21 Feb 2019 06:47  Mg     1.5     02-21    TPro  4.2<L>  /  Alb  2.6<L>  /  TBili  0.7  /  DBili  x   /  AST  11  /  ALT  11  /  AlkPhos  23<L>  02-21    PT/INR - ( 21 Feb 2019 17:50 )   PT: 13.7 sec;   INR: 1.21          PTT - ( 21 Feb 2019 17:50 )  PTT:29.2 sec      RADIOLOGY, EKG & ADDITIONAL TESTS: Reviewed.       MEDICATIONS  (STANDING):  cefTRIAXone   IVPB 1 Gram(s) IV Intermittent every 24 hours  influenza   Vaccine 0.5 milliLiter(s) IntraMuscular once  lactated ringers. 1000 milliLiter(s) (100 mL/Hr) IV Continuous <Continuous>  multivitamin/thiamine/folic acid in sodium chloride 0.9% 1000 milliLiter(s) (100 mL/Hr) IV Continuous <Continuous>  octreotide  Infusion 40 MICROgram(s)/Hr (8 mL/Hr) IV Continuous <Continuous>  pantoprazole  Injectable 40 milliGRAM(s) IV Push every 12 hours    MEDICATIONS  (PRN):  LORazepam   Injectable 2 milliGRAM(s) IV Push every 2 hours PRN CIWA score greater than 8

## 2019-02-22 DIAGNOSIS — K75.9 INFLAMMATORY LIVER DISEASE, UNSPECIFIED: ICD-10-CM

## 2019-02-22 LAB
A1AT SERPL-MCNC: 104 MG/DL — SIGNIFICANT CHANGE UP (ref 90–200)
ANION GAP SERPL CALC-SCNC: 6 MMOL/L — SIGNIFICANT CHANGE UP (ref 5–17)
BUN SERPL-MCNC: 17 MG/DL — SIGNIFICANT CHANGE UP (ref 7–23)
CALCIUM SERPL-MCNC: 7.1 MG/DL — LOW (ref 8.4–10.5)
CERULOPLASMIN SERPL-MCNC: 11 MG/DL — LOW (ref 15–30)
CHLORIDE SERPL-SCNC: 114 MMOL/L — HIGH (ref 96–108)
CO2 SERPL-SCNC: 24 MMOL/L — SIGNIFICANT CHANGE UP (ref 22–31)
CREAT SERPL-MCNC: 0.79 MG/DL — SIGNIFICANT CHANGE UP (ref 0.5–1.3)
GLUCOSE SERPL-MCNC: 147 MG/DL — HIGH (ref 70–99)
HCT VFR BLD CALC: 19.8 % — CRITICAL LOW (ref 39–50)
HCT VFR BLD CALC: 20.4 % — CRITICAL LOW (ref 39–50)
HCT VFR BLD CALC: 20.8 % — CRITICAL LOW (ref 39–50)
HCT VFR BLD CALC: 20.8 % — CRITICAL LOW (ref 39–50)
HCT VFR BLD CALC: 22.6 % — LOW (ref 39–50)
HGB BLD-MCNC: 6.4 G/DL — CRITICAL LOW (ref 13–17)
HGB BLD-MCNC: 6.8 G/DL — CRITICAL LOW (ref 13–17)
HGB BLD-MCNC: 6.8 G/DL — CRITICAL LOW (ref 13–17)
HGB BLD-MCNC: 7 G/DL — CRITICAL LOW (ref 13–17)
HGB BLD-MCNC: 7.6 G/DL — LOW (ref 13–17)
MAGNESIUM SERPL-MCNC: 2 MG/DL — SIGNIFICANT CHANGE UP (ref 1.6–2.6)
MCHC RBC-ENTMCNC: 30.5 PG — SIGNIFICANT CHANGE UP (ref 27–34)
MCHC RBC-ENTMCNC: 30.9 PG — SIGNIFICANT CHANGE UP (ref 27–34)
MCHC RBC-ENTMCNC: 31.2 PG — SIGNIFICANT CHANGE UP (ref 27–34)
MCHC RBC-ENTMCNC: 31.4 PG — SIGNIFICANT CHANGE UP (ref 27–34)
MCHC RBC-ENTMCNC: 31.4 PG — SIGNIFICANT CHANGE UP (ref 27–34)
MCHC RBC-ENTMCNC: 32.3 GM/DL — SIGNIFICANT CHANGE UP (ref 32–36)
MCHC RBC-ENTMCNC: 32.7 GM/DL — SIGNIFICANT CHANGE UP (ref 32–36)
MCHC RBC-ENTMCNC: 33.3 GM/DL — SIGNIFICANT CHANGE UP (ref 32–36)
MCHC RBC-ENTMCNC: 33.6 GM/DL — SIGNIFICANT CHANGE UP (ref 32–36)
MCHC RBC-ENTMCNC: 33.7 GM/DL — SIGNIFICANT CHANGE UP (ref 32–36)
MCV RBC AUTO: 93.3 FL — SIGNIFICANT CHANGE UP (ref 80–100)
MCV RBC AUTO: 93.4 FL — SIGNIFICANT CHANGE UP (ref 80–100)
MCV RBC AUTO: 93.6 FL — SIGNIFICANT CHANGE UP (ref 80–100)
MCV RBC AUTO: 94.3 FL — SIGNIFICANT CHANGE UP (ref 80–100)
MCV RBC AUTO: 94.5 FL — SIGNIFICANT CHANGE UP (ref 80–100)
NRBC # BLD: 0 /100 WBCS — SIGNIFICANT CHANGE UP (ref 0–0)
PLATELET # BLD AUTO: 40 K/UL — LOW (ref 150–400)
PLATELET # BLD AUTO: 48 K/UL — LOW (ref 150–400)
PLATELET # BLD AUTO: 50 K/UL — LOW (ref 150–400)
PLATELET # BLD AUTO: 53 K/UL — LOW (ref 150–400)
PLATELET # BLD AUTO: 72 K/UL — LOW (ref 150–400)
POTASSIUM SERPL-MCNC: 3.9 MMOL/L — SIGNIFICANT CHANGE UP (ref 3.5–5.3)
POTASSIUM SERPL-SCNC: 3.9 MMOL/L — SIGNIFICANT CHANGE UP (ref 3.5–5.3)
RBC # BLD: 2.1 M/UL — LOW (ref 4.2–5.8)
RBC # BLD: 2.18 M/UL — LOW (ref 4.2–5.8)
RBC # BLD: 2.2 M/UL — LOW (ref 4.2–5.8)
RBC # BLD: 2.23 M/UL — LOW (ref 4.2–5.8)
RBC # BLD: 2.42 M/UL — LOW (ref 4.2–5.8)
RBC # FLD: 16.6 % — HIGH (ref 10.3–14.5)
RBC # FLD: 16.8 % — HIGH (ref 10.3–14.5)
RBC # FLD: 16.8 % — HIGH (ref 10.3–14.5)
RBC # FLD: 17 % — HIGH (ref 10.3–14.5)
RBC # FLD: 17.1 % — HIGH (ref 10.3–14.5)
SODIUM SERPL-SCNC: 144 MMOL/L — SIGNIFICANT CHANGE UP (ref 135–145)
WBC # BLD: 4.71 K/UL — SIGNIFICANT CHANGE UP (ref 3.8–10.5)
WBC # BLD: 5.19 K/UL — SIGNIFICANT CHANGE UP (ref 3.8–10.5)
WBC # BLD: 5.63 K/UL — SIGNIFICANT CHANGE UP (ref 3.8–10.5)
WBC # BLD: 6.79 K/UL — SIGNIFICANT CHANGE UP (ref 3.8–10.5)
WBC # BLD: 8.95 K/UL — SIGNIFICANT CHANGE UP (ref 3.8–10.5)
WBC # FLD AUTO: 4.71 K/UL — SIGNIFICANT CHANGE UP (ref 3.8–10.5)
WBC # FLD AUTO: 5.19 K/UL — SIGNIFICANT CHANGE UP (ref 3.8–10.5)
WBC # FLD AUTO: 5.63 K/UL — SIGNIFICANT CHANGE UP (ref 3.8–10.5)
WBC # FLD AUTO: 6.79 K/UL — SIGNIFICANT CHANGE UP (ref 3.8–10.5)
WBC # FLD AUTO: 8.95 K/UL — SIGNIFICANT CHANGE UP (ref 3.8–10.5)

## 2019-02-22 PROCEDURE — 99233 SBSQ HOSP IP/OBS HIGH 50: CPT | Mod: GC

## 2019-02-22 PROCEDURE — 76700 US EXAM ABDOM COMPLETE: CPT | Mod: 26,59

## 2019-02-22 PROCEDURE — 93975 VASCULAR STUDY: CPT | Mod: 26

## 2019-02-22 PROCEDURE — 99232 SBSQ HOSP IP/OBS MODERATE 35: CPT

## 2019-02-22 RX ORDER — SODIUM CHLORIDE 9 MG/ML
1000 INJECTION, SOLUTION INTRAVENOUS
Qty: 0 | Refills: 0 | Status: DISCONTINUED | OUTPATIENT
Start: 2019-02-22 | End: 2019-02-24

## 2019-02-22 RX ADMIN — PANTOPRAZOLE SODIUM 40 MILLIGRAM(S): 20 TABLET, DELAYED RELEASE ORAL at 06:49

## 2019-02-22 RX ADMIN — PANTOPRAZOLE SODIUM 40 MILLIGRAM(S): 20 TABLET, DELAYED RELEASE ORAL at 17:34

## 2019-02-22 RX ADMIN — OCTREOTIDE ACETATE 8 MICROGRAM(S)/HR: 200 INJECTION, SOLUTION INTRAVENOUS; SUBCUTANEOUS at 01:02

## 2019-02-22 RX ADMIN — OCTREOTIDE ACETATE 8 MICROGRAM(S)/HR: 200 INJECTION, SOLUTION INTRAVENOUS; SUBCUTANEOUS at 13:52

## 2019-02-22 RX ADMIN — SODIUM CHLORIDE 50 MILLILITER(S): 9 INJECTION, SOLUTION INTRAVENOUS at 19:10

## 2019-02-22 RX ADMIN — CEFTRIAXONE 100 GRAM(S): 500 INJECTION, POWDER, FOR SOLUTION INTRAMUSCULAR; INTRAVENOUS at 12:54

## 2019-02-22 NOTE — PROGRESS NOTE ADULT - SUBJECTIVE AND OBJECTIVE BOX
Interval history:  Hg in the am was 7.6 and at 1 pm came back at 7.0. Patient only had 50 cc of dark melena at noon.  HD stable  Denies chest pain, palpitations, hematemesis, nausea, vomiting, or diarrhea.         PHYSICAL EXAM:  Constitutional: Young  male sitting in bed comfortably	  Head: NC/AT  Eyes: PERRL, EOMI, pale conjunctiva   ENT: no nasal discharge; uvula midline, dry mucous membranes, good dentition, no oral ulcers  Neck: supple; no JVD or thyromegaly  Respiratory: CTA B/L, breathing comfortably on room air, no accessory muscle use. No wheezes/crackles/rhonchi  Cardiac: +S1/S2; tachycardic to 110s.  Gastrointestinal: Soft, non-tender, non-distended, normal bowel sounds 	  Extremities: WWP, no clubbing or cyanosis; no peripheral edema  Musculoskeletal: NROM x4; no joint swelling, tenderness or erythema  Vascular: 2+ DP pulses   Dermatologic: skin warm, dry and intact  Neurologic: AAOx3; CNII-XII grossly intact; no focal deficits    .  LABS:                         7.0    5.63  )-----------( 50       ( 22 Feb 2019 13:07 )             20.8     02-22    144  |  114<H>  |  17  ----------------------------<  147<H>  3.9   |  24  |  0.79    Ca    7.1<L>      22 Feb 2019 07:30  Mg     2.0     02-22    TPro  4.2<L>  /  Alb  2.6<L>  /  TBili  0.7  /  DBili  x   /  AST  11  /  ALT  11  /  AlkPhos  23<L>  02-21    PT/INR - ( 21 Feb 2019 17:50 )   PT: 13.7 sec;   INR: 1.21          PTT - ( 21 Feb 2019 17:50 )  PTT:29.2 sec      MEDICATIONS  (STANDING):  cefTRIAXone   IVPB 1 Gram(s) IV Intermittent every 24 hours  influenza   Vaccine 0.5 milliLiter(s) IntraMuscular once  lactated ringers. 1000 milliLiter(s) (50 mL/Hr) IV Continuous <Continuous>  multivitamin/thiamine/folic acid in sodium chloride 0.9% 1000 milliLiter(s) (100 mL/Hr) IV Continuous <Continuous>  octreotide  Infusion 40 MICROgram(s)/Hr (8 mL/Hr) IV Continuous <Continuous>  pantoprazole  Injectable 40 milliGRAM(s) IV Push every 12 hours    MEDICATIONS  (PRN):  LORazepam   Injectable 2 milliGRAM(s) IV Push every 2 hours PRN CIWA score greater than 8

## 2019-02-22 NOTE — PROGRESS NOTE ADULT - ASSESSMENT
39 y/o M w/ no significant PMHx presenting w/ episode of hematemesis and melena admitted to [facility] for acute blood loss anemia secondary to upper GI bleed with esophageal varices.

## 2019-02-22 NOTE — PROGRESS NOTE ADULT - ASSESSMENT
38 year old male with hematemesis and melena with symptomatic anemia noted to have drop in hgb concern for UGIB. EGD performed demonstrating multiple F3 and F2 varices s/p variceal banding. Unclear etiology of patients cirrhosis as pt denies family hx of liver disease, and did not demonstrate stigmata of liver disease. Pt reports binge drinking over the weekend, but denies hx of alcoholism which is typically associated with decompensated liver disease.     #Variceal Bleed due to cirrhosis of unclear origin   -Cont octreotide for a total of 72 hours   -Ceftriaxone for SBP ppx  -IV PPI BID   -Hepatitis serologies neg  -ELENA, ASMA, AMA, Anti-lkm pending  -Ceruloplasmin, alpha-1 antitrypsin pending   -RUQ sono with doppler to r/o budd chiari   -Please send off iron studies + ferritin   -Obtain diagnostic para of any tappable ascites (please ensure plts >50k)  -Can advance diet to clears   -Restrictive transfusion (hgb <7)  -If any evidence of further decompensation, please transfer to MICU for bedside endoscopy   -Can plan for repeat EGD on Monday to complete examination as fundus was not cleared due to clot burden       GI Following closely    case to be d/w Dr. Greer

## 2019-02-22 NOTE — PROGRESS NOTE ADULT - PROBLEM SELECTOR PLAN 4
-patient has a history of drinking 8-9 drinks on the weekends  -current CIWA 2, will continue with CIWA protocol for now, can end on Sunday  -ativan 2 mg IV if CIWA >8

## 2019-02-22 NOTE — PROGRESS NOTE ADULT - SUBJECTIVE AND OBJECTIVE BOX
Pt seen and examined at bedside this am, no acute overnight events. Pt with melenic BM overnight. Tachycardia and hypotension resolved.           MEDICATIONS:  MEDICATIONS  (STANDING):  cefTRIAXone   IVPB 1 Gram(s) IV Intermittent every 24 hours  influenza   Vaccine 0.5 milliLiter(s) IntraMuscular once  lactated ringers. 1000 milliLiter(s) (100 mL/Hr) IV Continuous <Continuous>  multivitamin/thiamine/folic acid in sodium chloride 0.9% 1000 milliLiter(s) (100 mL/Hr) IV Continuous <Continuous>  octreotide  Infusion 40 MICROgram(s)/Hr (8 mL/Hr) IV Continuous <Continuous>  pantoprazole  Injectable 40 milliGRAM(s) IV Push every 12 hours    MEDICATIONS  (PRN):  LORazepam   Injectable 2 milliGRAM(s) IV Push every 2 hours PRN CIWA score greater than 8      Allergies    No Known Allergies    Intolerances        Vital Signs Last 24 Hrs  T(C): 36.8 (22 Feb 2019 05:51), Max: 37.4 (21 Feb 2019 09:47)  T(F): 98.3 (22 Feb 2019 05:51), Max: 99.3 (21 Feb 2019 09:47)  HR: 78 (22 Feb 2019 05:30) (78 - 126)  BP: 89/47 (22 Feb 2019 05:30) (81/50 - 111/54)  BP(mean): 65 (22 Feb 2019 05:30) (60 - 79)  RR: 18 (22 Feb 2019 05:30) (16 - 18)  SpO2: 98% (22 Feb 2019 05:30) (92% - 100%)    02-21 @ 07:01  -  02-22 @ 07:00  --------------------------------------------------------  IN: 1546 mL / OUT: 200 mL / NET: 1346 mL        PHYSICAL EXAM:    General: Well developed; well nourished; in no acute distress  HEENT: MMM, conjunctiva and sclera clear  Gastrointestinal: Soft non-tender non-distended; Normal bowel sounds;     LABS:      CBC Full  -  ( 22 Feb 2019 07:30 )  WBC Count : 6.79 K/uL  Hemoglobin : 7.6 g/dL  Hematocrit : 22.6 %  Platelet Count - Automated : 53 K/uL  Mean Cell Volume : 93.4 fl  Mean Cell Hemoglobin : 31.4 pg  Mean Cell Hemoglobin Concentration : 33.6 gm/dL  Auto Neutrophil # : x  Auto Lymphocyte # : x  Auto Monocyte # : x  Auto Eosinophil # : x  Auto Basophil # : x  Auto Neutrophil % : x  Auto Lymphocyte % : x  Auto Monocyte % : x  Auto Eosinophil % : x  Auto Basophil % : x    02-22    144  |  114<H>  |  17  ----------------------------<  147<H>  3.9   |  24  |  0.79    Ca    7.1<L>      22 Feb 2019 07:30  Mg     2.0     02-22    TPro  4.2<L>  /  Alb  2.6<L>  /  TBili  0.7  /  DBili  x   /  AST  11  /  ALT  11  /  AlkPhos  23<L>  02-21    PT/INR - ( 21 Feb 2019 17:50 )   PT: 13.7 sec;   INR: 1.21          PTT - ( 21 Feb 2019 17:50 )  PTT:29.2 sec                  RADIOLOGY & ADDITIONAL STUDIES (The following images were personally reviewed):

## 2019-02-22 NOTE — PROGRESS NOTE ADULT - PROBLEM SELECTOR PLAN 2
-AST and ALT are within normal limits  -ultrasound of liver suggestive of portal hypertension and hepatitis  -will need broad workup: autoimmune hepatitis panel and iron panel  -ceruloplasmin low, will need 24 hour copper study  -hepatitis B, and C negative

## 2019-02-22 NOTE — PROGRESS NOTE ADULT - PROBLEM SELECTOR PLAN 1
-Patient presenting w/ episode of hematemesis x2 and melena.   -EGD showed esophageal varices, with post 5 clips  -patient had a total of 4 units of pRBC and 5 L NS  -octreotide at infusion rate of 40 micrograms/hour for a total of 72 hours.  -protonix 40 mg iv BID  -will start w/u for occult liver disease: abdominal ultrasound with doppler shows dilated hepatic vein, with coarse findings on liver, and portal hypertension.  -autoimmune panel for hepatitis  -Maintain active type and screen, two large bore IVs  -if becomes tachycardic or HD unstable, stat GI consult

## 2019-02-22 NOTE — PROGRESS NOTE ADULT - PROBLEM SELECTOR PLAN 3
-Patient w/ WBC of 18 upon admission. Low concern for active infection. Patient afebrile. Likely reactive in the setting of GI bleed.   -WBC currently 12.7 continue to monitor.   -continue to follow

## 2019-02-23 LAB
ALBUMIN SERPL ELPH-MCNC: 2.8 G/DL — LOW (ref 3.3–5)
ALP SERPL-CCNC: 26 U/L — LOW (ref 40–120)
ALT FLD-CCNC: 10 U/L — SIGNIFICANT CHANGE UP (ref 10–45)
ANION GAP SERPL CALC-SCNC: 7 MMOL/L — SIGNIFICANT CHANGE UP (ref 5–17)
APTT BLD: 26.6 SEC — LOW (ref 27.5–36.3)
AST SERPL-CCNC: 14 U/L — SIGNIFICANT CHANGE UP (ref 10–40)
BILIRUB SERPL-MCNC: 0.8 MG/DL — SIGNIFICANT CHANGE UP (ref 0.2–1.2)
BUN SERPL-MCNC: 6 MG/DL — LOW (ref 7–23)
CALCIUM SERPL-MCNC: 7.7 MG/DL — LOW (ref 8.4–10.5)
CHLORIDE SERPL-SCNC: 110 MMOL/L — HIGH (ref 96–108)
CO2 SERPL-SCNC: 25 MMOL/L — SIGNIFICANT CHANGE UP (ref 22–31)
CREAT SERPL-MCNC: 0.72 MG/DL — SIGNIFICANT CHANGE UP (ref 0.5–1.3)
FERRITIN SERPL-MCNC: 185 NG/ML — SIGNIFICANT CHANGE UP (ref 30–400)
GLUCOSE SERPL-MCNC: 124 MG/DL — HIGH (ref 70–99)
HBA1C BLD-MCNC: 5.2 % — SIGNIFICANT CHANGE UP (ref 4–5.6)
HCT VFR BLD CALC: 22.2 % — LOW (ref 39–50)
HCT VFR BLD CALC: 22.6 % — LOW (ref 39–50)
HCT VFR BLD CALC: 22.6 % — LOW (ref 39–50)
HGB BLD-MCNC: 7.3 G/DL — LOW (ref 13–17)
HGB BLD-MCNC: 7.4 G/DL — LOW (ref 13–17)
HGB BLD-MCNC: 7.5 G/DL — LOW (ref 13–17)
INR BLD: 1.08 — SIGNIFICANT CHANGE UP (ref 0.88–1.16)
IRON SATN MFR SERPL: 16 % — SIGNIFICANT CHANGE UP (ref 16–55)
IRON SATN MFR SERPL: 33 UG/DL — LOW (ref 45–165)
MAGNESIUM SERPL-MCNC: 1.8 MG/DL — SIGNIFICANT CHANGE UP (ref 1.6–2.6)
MCHC RBC-ENTMCNC: 30.6 PG — SIGNIFICANT CHANGE UP (ref 27–34)
MCHC RBC-ENTMCNC: 30.7 PG — SIGNIFICANT CHANGE UP (ref 27–34)
MCHC RBC-ENTMCNC: 30.9 PG — SIGNIFICANT CHANGE UP (ref 27–34)
MCHC RBC-ENTMCNC: 32.7 GM/DL — SIGNIFICANT CHANGE UP (ref 32–36)
MCHC RBC-ENTMCNC: 32.9 GM/DL — SIGNIFICANT CHANGE UP (ref 32–36)
MCHC RBC-ENTMCNC: 33.2 GM/DL — SIGNIFICANT CHANGE UP (ref 32–36)
MCV RBC AUTO: 93 FL — SIGNIFICANT CHANGE UP (ref 80–100)
MCV RBC AUTO: 93.3 FL — SIGNIFICANT CHANGE UP (ref 80–100)
MCV RBC AUTO: 93.4 FL — SIGNIFICANT CHANGE UP (ref 80–100)
NRBC # BLD: 0 /100 WBCS — SIGNIFICANT CHANGE UP (ref 0–0)
PHOSPHATE SERPL-MCNC: 2.6 MG/DL — SIGNIFICANT CHANGE UP (ref 2.5–4.5)
PLATELET # BLD AUTO: 60 K/UL — LOW (ref 150–400)
PLATELET # BLD AUTO: 61 K/UL — LOW (ref 150–400)
PLATELET # BLD AUTO: 64 K/UL — LOW (ref 150–400)
POTASSIUM SERPL-MCNC: 3.4 MMOL/L — LOW (ref 3.5–5.3)
POTASSIUM SERPL-SCNC: 3.4 MMOL/L — LOW (ref 3.5–5.3)
PROT SERPL-MCNC: 4.4 G/DL — LOW (ref 6–8.3)
PROTHROM AB SERPL-ACNC: 12.2 SEC — SIGNIFICANT CHANGE UP (ref 10–12.9)
RBC # BLD: 2.38 M/UL — LOW (ref 4.2–5.8)
RBC # BLD: 2.42 M/UL — LOW (ref 4.2–5.8)
RBC # BLD: 2.43 M/UL — LOW (ref 4.2–5.8)
RBC # FLD: 16.6 % — HIGH (ref 10.3–14.5)
RBC # FLD: 16.8 % — HIGH (ref 10.3–14.5)
RBC # FLD: 16.9 % — HIGH (ref 10.3–14.5)
SODIUM SERPL-SCNC: 142 MMOL/L — SIGNIFICANT CHANGE UP (ref 135–145)
TIBC SERPL-MCNC: 205 UG/DL — LOW (ref 220–430)
TRANSFERRIN SERPL-MCNC: 164 MG/DL — LOW (ref 200–360)
UIBC SERPL-MCNC: 172 UG/DL — SIGNIFICANT CHANGE UP (ref 110–370)
WBC # BLD: 3.33 K/UL — LOW (ref 3.8–10.5)
WBC # BLD: 3.85 K/UL — SIGNIFICANT CHANGE UP (ref 3.8–10.5)
WBC # BLD: 4.63 K/UL — SIGNIFICANT CHANGE UP (ref 3.8–10.5)
WBC # FLD AUTO: 3.33 K/UL — LOW (ref 3.8–10.5)
WBC # FLD AUTO: 3.85 K/UL — SIGNIFICANT CHANGE UP (ref 3.8–10.5)
WBC # FLD AUTO: 4.63 K/UL — SIGNIFICANT CHANGE UP (ref 3.8–10.5)

## 2019-02-23 PROCEDURE — 99233 SBSQ HOSP IP/OBS HIGH 50: CPT | Mod: GC

## 2019-02-23 RX ORDER — DIPHENHYDRAMINE HCL 50 MG
12.5 CAPSULE ORAL ONCE
Qty: 0 | Refills: 0 | Status: COMPLETED | OUTPATIENT
Start: 2019-02-23 | End: 2019-02-23

## 2019-02-23 RX ORDER — DIPHENHYDRAMINE HCL 50 MG
25 CAPSULE ORAL ONCE
Qty: 0 | Refills: 0 | Status: DISCONTINUED | OUTPATIENT
Start: 2019-02-23 | End: 2019-02-23

## 2019-02-23 RX ORDER — POTASSIUM CHLORIDE 20 MEQ
40 PACKET (EA) ORAL EVERY 4 HOURS
Qty: 0 | Refills: 0 | Status: COMPLETED | OUTPATIENT
Start: 2019-02-23 | End: 2019-02-23

## 2019-02-23 RX ADMIN — OCTREOTIDE ACETATE 8 MICROGRAM(S)/HR: 200 INJECTION, SOLUTION INTRAVENOUS; SUBCUTANEOUS at 15:39

## 2019-02-23 RX ADMIN — Medication 40 MILLIEQUIVALENT(S): at 11:34

## 2019-02-23 RX ADMIN — Medication 40 MILLIEQUIVALENT(S): at 14:18

## 2019-02-23 RX ADMIN — OCTREOTIDE ACETATE 8 MICROGRAM(S)/HR: 200 INJECTION, SOLUTION INTRAVENOUS; SUBCUTANEOUS at 03:02

## 2019-02-23 RX ADMIN — PANTOPRAZOLE SODIUM 40 MILLIGRAM(S): 20 TABLET, DELAYED RELEASE ORAL at 18:09

## 2019-02-23 RX ADMIN — PANTOPRAZOLE SODIUM 40 MILLIGRAM(S): 20 TABLET, DELAYED RELEASE ORAL at 06:44

## 2019-02-23 RX ADMIN — Medication 12.5 MILLIGRAM(S): at 04:20

## 2019-02-23 RX ADMIN — CEFTRIAXONE 100 GRAM(S): 500 INJECTION, POWDER, FOR SOLUTION INTRAMUSCULAR; INTRAVENOUS at 14:17

## 2019-02-23 NOTE — PROGRESS NOTE ADULT - PROBLEM SELECTOR PLAN 1
-Patient presenting w/ episode of hematemesis x2 and melena.   -EGD showed esophageal varices, with post 5 clips  -patient had a total of 4 units of pRBC and 5 L NS  -octreotide at infusion rate of 40 micrograms/hour for a total of 72 hours.  -protonix 40 mg iv BID  -will start w/u for occult liver disease: abdominal ultrasound with doppler shows dilated hepatic vein, with coarse findings on liver, and portal hypertension.  -autoimmune panel for hepatitis  -Maintain active type and screen, two large bore IVs  -if becomes tachycardic or HD unstable, stat GI consult -Patient presenting w/ episode of hematemesis x2 and melena.   -EGD showed esophageal varices, with post 5 clips  -patient had a total of 5 units of pRBC and 5 L NS  -octreotide at infusion rate of 40 micrograms/hour for a total of 72 hours.  -protonix 40 mg iv BID  -will start w/u for occult liver disease: abdominal ultrasound with doppler shows dilated hepatic vein, with coarse findings on liver, and portal hypertension.  -autoimmune panel for hepatitis  -Maintain active type and screen, two large bore IVs  -if becomes tachycardic or HD unstable, stat GI consult  - Hb on 2/23 stable

## 2019-02-23 NOTE — PROGRESS NOTE ADULT - PROBLEM SELECTOR PLAN 3
-Patient w/ WBC of 18 upon admission. Low concern for active infection. Patient afebrile. Likely reactive in the setting of GI bleed.   -WBC currently 12.7 continue to monitor.   -continue to follow -Patient w/ WBC of 18 upon admission. Low concern for active infection. Patient afebrile. Likely reactive in the setting of GI bleed.   -WBC no logner elevated. RESOLVED  -continue to follow

## 2019-02-23 NOTE — PROGRESS NOTE ADULT - SUBJECTIVE AND OBJECTIVE BOX
INTERVAL HPI/OVERNIGHT EVENTS:    SUBJECTIVE: Patient seen and examined at bedside.    OBJECTIVE:    VITAL SIGNS:  ICU Vital Signs Last 24 Hrs  T(C): 37.7 (23 Feb 2019 02:00), Max: 37.7 (23 Feb 2019 02:00)  T(F): 99.8 (23 Feb 2019 02:00), Max: 99.8 (23 Feb 2019 02:00)  HR: 82 (22 Feb 2019 23:41) (82 - 100)  BP: 93/54 (22 Feb 2019 23:41) (91/44 - 118/70)  BP(mean): 68 (22 Feb 2019 23:41) (64 - 84)  ABP: --  ABP(mean): --  RR: 17 (22 Feb 2019 23:41) (17 - 18)  SpO2: 100% (22 Feb 2019 23:41) (97% - 100%)        02-21 @ 07:01  -  02-22 @ 07:00  --------------------------------------------------------  IN: 1546 mL / OUT: 200 mL / NET: 1346 mL    02-22 @ 07:01  -  02-23 @ 06:23  --------------------------------------------------------  IN: 1152 mL / OUT: 1800 mL / NET: -648 mL      CAPILLARY BLOOD GLUCOSE          PHYSICAL EXAM:    General: NAD  HEENT: NC/AT; PERRL, clear conjunctiva  Neck: supple  Respiratory: lungs CTA b/l, no wheezes or rhonchi  Cardiovascular: +S1/S2; RRR, no murmurs, rubs, or gallops  Abdomen: soft, non-tender, non-distended; +BS in all 4 quadrants  Extremities: WWP, 2+ peripheral pulses b/l; no LE edema  Skin: normal color and turgor; no rash  Neurological: AOx3, no focal deficits noted    MEDICATIONS:  MEDICATIONS  (STANDING):  cefTRIAXone   IVPB 1 Gram(s) IV Intermittent every 24 hours  influenza   Vaccine 0.5 milliLiter(s) IntraMuscular once  lactated ringers. 1000 milliLiter(s) (50 mL/Hr) IV Continuous <Continuous>  octreotide  Infusion 40 MICROgram(s)/Hr (8 mL/Hr) IV Continuous <Continuous>  pantoprazole  Injectable 40 milliGRAM(s) IV Push every 12 hours    MEDICATIONS  (PRN):      ALLERGIES:  Allergies    No Known Allergies    Intolerances        LABS:                        6.4    4.71  )-----------( 40       ( 22 Feb 2019 23:07 )             19.8     02-22    144  |  114<H>  |  17  ----------------------------<  147<H>  3.9   |  24  |  0.79    Ca    7.1<L>      22 Feb 2019 07:30  Mg     2.0     02-22    TPro  4.2<L>  /  Alb  2.6<L>  /  TBili  0.7  /  DBili  x   /  AST  11  /  ALT  11  /  AlkPhos  23<L>  02-21    PT/INR - ( 21 Feb 2019 17:50 )   PT: 13.7 sec;   INR: 1.21          PTT - ( 21 Feb 2019 17:50 )  PTT:29.2 sec      RADIOLOGY & ADDITIONAL TESTS: INTERVAL HPI/OVERNIGHT EVENTS: got one unit pRBCs, had hives during transfusion and got benedryl 12.5 IVP    SUBJECTIVE: Patient seen and examined at bedside. Patient explains that he is feeling woozy from benedryl, but otherwise is feeling fine. He complains of occasional stomach tightness and hunger. No other complaints.     OBJECTIVE:    VITAL SIGNS:  ICU Vital Signs Last 24 Hrs  T(C): 37.7 (23 Feb 2019 02:00), Max: 37.7 (23 Feb 2019 02:00)  T(F): 99.8 (23 Feb 2019 02:00), Max: 99.8 (23 Feb 2019 02:00)  HR: 82 (22 Feb 2019 23:41) (82 - 100)  BP: 93/54 (22 Feb 2019 23:41) (91/44 - 118/70)  BP(mean): 68 (22 Feb 2019 23:41) (64 - 84)  ABP: --  ABP(mean): --  RR: 17 (22 Feb 2019 23:41) (17 - 18)  SpO2: 100% (22 Feb 2019 23:41) (97% - 100%)        02-21 @ 07:01  -  02-22 @ 07:00  --------------------------------------------------------  IN: 1546 mL / OUT: 200 mL / NET: 1346 mL    02-22 @ 07:01 - 02-23 @ 06:23  --------------------------------------------------------  IN: 1152 mL / OUT: 1800 mL / NET: -648 mL      CAPILLARY BLOOD GLUCOSE          PHYSICAL EXAM:    Constitutional: Young  man sitting in bed comfortably, looks tired, pale	  Head: NC/AT  Eyes: PERRL, EOMI, pale conjunctiva   ENT: moist mucous membranes, good dentition, no oral ulcers  Neck: supple; no JVD   Respiratory: CTA B/L, breathing comfortably on room air. No wheezes, rales, or rhonchi  Cardiac: +S1/S2; tachycardic, no murmurs, rubs, or gallops  Gastrointestinal: Soft, non-tender, non-distended, normal bowel sounds 	  Extremities: WWP, no clubbing or cyanosis; no peripheral edema  Musculoskeletal: NROM x4; no joint swelling, tenderness or erythema  Vascular: 2+ DP pulses   Dermatologic: skin warm, dry and intact, pale  Neurologic: AAOx3; CNII-XII grossly intact; no focal deficits    MEDICATIONS:  MEDICATIONS  (STANDING):  cefTRIAXone   IVPB 1 Gram(s) IV Intermittent every 24 hours  influenza   Vaccine 0.5 milliLiter(s) IntraMuscular once  lactated ringers. 1000 milliLiter(s) (50 mL/Hr) IV Continuous <Continuous>  octreotide  Infusion 40 MICROgram(s)/Hr (8 mL/Hr) IV Continuous <Continuous>  pantoprazole  Injectable 40 milliGRAM(s) IV Push every 12 hours    MEDICATIONS  (PRN):      ALLERGIES:  Allergies    No Known Allergies    Intolerances        LABS:                        6.4    4.71  )-----------( 40       ( 22 Feb 2019 23:07 )             19.8     02-22    144  |  114<H>  |  17  ----------------------------<  147<H>  3.9   |  24  |  0.79    Ca    7.1<L>      22 Feb 2019 07:30  Mg     2.0     02-22    TPro  4.2<L>  /  Alb  2.6<L>  /  TBili  0.7  /  DBili  x   /  AST  11  /  ALT  11  /  AlkPhos  23<L>  02-21    PT/INR - ( 21 Feb 2019 17:50 )   PT: 13.7 sec;   INR: 1.21          PTT - ( 21 Feb 2019 17:50 )  PTT:29.2 sec      RADIOLOGY & ADDITIONAL TESTS: Reviewed

## 2019-02-23 NOTE — PROGRESS NOTE ADULT - ASSESSMENT
37 y/o M w/ no significant PMHx presenting w/ episode of hematemesis and melena admitted to [facility] for acute blood loss anemia secondary to upper GI bleed with esophageal varices.

## 2019-02-23 NOTE — PROGRESS NOTE ADULT - SUBJECTIVE AND OBJECTIVE BOX
Pt seen and examined at bedside.  Received 1 unit plt and 1 unit prbc  after receiving plt, developed hives and received benadryl     PERTINENT REVIEW OF SYSTEMS:  CONSTITUTIONAL: No weakness, fevers or chills  HEENT: No visual changes; No vertigo or throat pain   GASTROINTESTINAL: No abdominal or epigastric pain. No nausea, vomiting, or hematemesis; No diarrhea or constipation. No melena or hematochezia.  NEUROLOGICAL: No numbness or weakness  SKIN: No itching, burning, rashes, or lesions     Allergies    No Known Allergies    Intolerances      MEDICATIONS:  MEDICATIONS  (STANDING):  cefTRIAXone   IVPB 1 Gram(s) IV Intermittent every 24 hours  influenza   Vaccine 0.5 milliLiter(s) IntraMuscular once  lactated ringers. 1000 milliLiter(s) (50 mL/Hr) IV Continuous <Continuous>  octreotide  Infusion 40 MICROgram(s)/Hr (8 mL/Hr) IV Continuous <Continuous>  pantoprazole  Injectable 40 milliGRAM(s) IV Push every 12 hours  potassium chloride    Tablet ER 40 milliEquivalent(s) Oral every 4 hours    MEDICATIONS  (PRN):    Vital Signs Last 24 Hrs  T(C): 37.7 (23 Feb 2019 02:00), Max: 37.7 (23 Feb 2019 02:00)  T(F): 99.8 (23 Feb 2019 02:00), Max: 99.8 (23 Feb 2019 02:00)  HR: 82 (23 Feb 2019 04:31) (80 - 100)  BP: 101/55 (23 Feb 2019 04:31) (91/44 - 118/70)  BP(mean): 74 (23 Feb 2019 04:31) (64 - 84)  RR: 17 (23 Feb 2019 04:31) (17 - 19)  SpO2: 100% (23 Feb 2019 04:31) (97% - 100%)    02-22 @ 07:01  -  02-23 @ 07:00  --------------------------------------------------------  IN: 1152 mL / OUT: 1800 mL / NET: -648 mL      PHYSICAL EXAM:    General: Well developed; well nourished; in no acute distress  HEENT: MMM, conjunctiva and sclera clear  Gastrointestinal: Soft non-tender non-distended; Normal bowel sounds; No hepatosplenomegaly. No rebound or guarding  Skin: Warm and dry. No obvious rash    LABS:                        7.5    4.63  )-----------( 61       ( 23 Feb 2019 06:17 )             22.6     02-23    142  |  110<H>  |  6<L>  ----------------------------<  124<H>  3.4<L>   |  25  |  0.72    Ca    7.7<L>      23 Feb 2019 06:17  Phos  2.6     02-23  Mg     1.8     02-23    TPro  4.4<L>  /  Alb  2.8<L>  /  TBili  0.8  /  DBili  x   /  AST  14  /  ALT  10  /  AlkPhos  26<L>  02-23    PT/INR - ( 23 Feb 2019 06:17 )   PT: 12.2 sec;   INR: 1.08          PTT - ( 23 Feb 2019 06:17 )  PTT:26.6 sec                  RADIOLOGY & ADDITIONAL STUDIES: Pt seen and examined at bedside. Pt reports heartburn this am   Received 1 unit plt and 1 unit prbc  after receiving plt, developed hives and received benadryl     PERTINENT REVIEW OF SYSTEMS:  CONSTITUTIONAL: No weakness, fevers or chills  HEENT: No visual changes; No vertigo or throat pain   GASTROINTESTINAL: No abdominal or epigastric pain. No nausea, vomiting, or hematemesis; No diarrhea or constipation. No melena or hematochezia.  NEUROLOGICAL: No numbness or weakness  SKIN: No itching, burning, rashes, or lesions     Allergies    No Known Allergies    Intolerances      MEDICATIONS:  MEDICATIONS  (STANDING):  cefTRIAXone   IVPB 1 Gram(s) IV Intermittent every 24 hours  influenza   Vaccine 0.5 milliLiter(s) IntraMuscular once  lactated ringers. 1000 milliLiter(s) (50 mL/Hr) IV Continuous <Continuous>  octreotide  Infusion 40 MICROgram(s)/Hr (8 mL/Hr) IV Continuous <Continuous>  pantoprazole  Injectable 40 milliGRAM(s) IV Push every 12 hours  potassium chloride    Tablet ER 40 milliEquivalent(s) Oral every 4 hours    MEDICATIONS  (PRN):    Vital Signs Last 24 Hrs  T(C): 37.7 (23 Feb 2019 02:00), Max: 37.7 (23 Feb 2019 02:00)  T(F): 99.8 (23 Feb 2019 02:00), Max: 99.8 (23 Feb 2019 02:00)  HR: 82 (23 Feb 2019 04:31) (80 - 100)  BP: 101/55 (23 Feb 2019 04:31) (91/44 - 118/70)  BP(mean): 74 (23 Feb 2019 04:31) (64 - 84)  RR: 17 (23 Feb 2019 04:31) (17 - 19)  SpO2: 100% (23 Feb 2019 04:31) (97% - 100%)    02-22 @ 07:01  -  02-23 @ 07:00  --------------------------------------------------------  IN: 1152 mL / OUT: 1800 mL / NET: -648 mL      PHYSICAL EXAM:    General: Well developed; well nourished; in no acute distress  HEENT: MMM, conjunctiva and sclera clear  Gastrointestinal: Soft non-tender non-distended; Normal bowel sounds; No hepatosplenomegaly. No rebound or guarding  Skin: Warm and dry. No obvious rash    LABS:                        7.5    4.63  )-----------( 61       ( 23 Feb 2019 06:17 )             22.6     02-23    142  |  110<H>  |  6<L>  ----------------------------<  124<H>  3.4<L>   |  25  |  0.72    Ca    7.7<L>      23 Feb 2019 06:17  Phos  2.6     02-23  Mg     1.8     02-23    TPro  4.4<L>  /  Alb  2.8<L>  /  TBili  0.8  /  DBili  x   /  AST  14  /  ALT  10  /  AlkPhos  26<L>  02-23    PT/INR - ( 23 Feb 2019 06:17 )   PT: 12.2 sec;   INR: 1.08          PTT - ( 23 Feb 2019 06:17 )  PTT:26.6 sec                  RADIOLOGY & ADDITIONAL STUDIES:

## 2019-02-23 NOTE — PROGRESS NOTE ADULT - PROBLEM SELECTOR PLAN 4
-patient has a history of drinking 8-9 drinks on the weekends  -current CIWA 2, will continue with CIWA protocol for now, can end on Sunday  -ativan 2 mg IV if CIWA >8 -patient has a history of drinking 8-9 drinks on the weekends  -current CIWA 0, no need for further CIWAs

## 2019-02-23 NOTE — PROGRESS NOTE ADULT - ASSESSMENT
38 year old male with hematemesis and melena with symptomatic anemia noted to have drop in hgb concern for UGIB. EGD performed demonstrating multiple F3 and F2 varices s/p variceal banding. Unclear etiology of patients cirrhosis as pt denies family hx of liver disease, and did not demonstrate stigmata of liver disease. Pt reports binge drinking over the weekend, but denies hx of alcoholism which is typically associated with decompensated liver disease.     #Variceal Bleed due to cirrhosis of unclear origin   -Cont octreotide for a total of 72 hours   -Ceftriaxone for SBP ppx  -IV PPI BID   -Hepatitis serologies neg  -ELENA, ASMA, AMA, Anti-lkm pending  -Ceruloplasmin low,   -alpha-1 antitrypsin normal   -RUQ sono with doppler to r/o budd chiari   - iron studies  c/w DIOR + ferritin   -Obtain diagnostic para of any tappable ascites (please ensure plts >50k)  -Can advance diet to clears   -Restrictive transfusion (hgb <7)  -If any evidence of further decompensation, please transfer to MICU for bedside endoscopy   -Can plan for repeat EGD on Monday to complete examination as fundus was not cleared due to clot burden       GI Following closely    case to be d/w Dr. Greer 38 year old male with hematemesis and melena with symptomatic anemia noted to have drop in hgb concern for UGIB. EGD performed demonstrating multiple F3 and F2 varices s/p variceal banding. Unclear etiology of patients cirrhosis as pt denies family hx of liver disease, and did not demonstrate stigmata of liver disease. Pt reports binge drinking over the weekend, but denies hx of alcoholism which is typically associated with decompensated liver disease.     #Variceal Bleed due to cirrhosis of unclear origin   -Cont octreotide for a total of 72 hours   -Ceftriaxone for SBP ppx  -IV PPI BID   -Hepatitis serologies neg  -ELENA, ASMA, AMA, Anti-lkm, IgG pending  -also recommend lipids, A1c  -Ceruloplasmin low, so please send off 24 hr urine copper, to r/o bonita disease  -alpha-1 antitrypsin normal   -RUQ sono with doppler shows patency of visualized portal and hepatic vasculature   - iron studies  c/w DIOR  -Obtain diagnostic para of any tappable ascites (please ensure plts >50k)  -Can advance diet to clears   -Restrictive transfusion (hgb <7)- pt received 1 unit prbc, 1 unit plt, vitals sign stable, no evidence of further GIB  -If any evidence of further decompensation, please transfer to MICU for bedside endoscopy   -Can plan for repeat EGD on Monday to complete examination as fundus was not cleared due to clot burden       GI Following closely 38 year old male with hematemesis and melena with symptomatic anemia noted to have drop in hgb concern for UGIB. EGD performed demonstrating multiple F3 and F2 varices s/p variceal banding. Unclear etiology of patients cirrhosis as pt denies family hx of liver disease, and did not demonstrate stigmata of liver disease. Pt reports binge drinking over the weekend, but denies hx of alcoholism which is typically associated with decompensated liver disease.     #Variceal Bleed due to cirrhosis of unclear origin,  -Cont octreotide for a total of 72 hours   -Ceftriaxone for SBP ppx  -IV PPI BID   -Hepatitis serologies neg  -ELENA, ASMA, AMA, Anti-lkm, IgG pending  -also recommend lipids, A1c  -Ceruloplasmin low, so please send off 24 hr urine copper, to r/o bonita disease  -alpha-1 antitrypsin normal   -RUQ sono with doppler shows patency of visualized portal and hepatic vasculature   - iron studies  c/w DIOR  -Obtain diagnostic para of any tappable ascites (please ensure plts >50k)  -Can advance diet to clears   -Restrictive transfusion (hgb <7)- pt received 1 unit prbc, 1 unit plt, vitals sign stable, no evidence of further GIB  -If any evidence of further decompensation, please transfer to MICU for bedside endoscopy   -Can plan for repeat EGD on Monday to complete examination as fundus was not cleared due to clot burden   -if liver workup negative, may be 2.2 non cirrhotic portal hypertension      GI Following closely

## 2019-02-23 NOTE — PROGRESS NOTE ADULT - PROBLEM SELECTOR PLAN 2
-AST and ALT are within normal limits  -ultrasound of liver suggestive of portal hypertension and hepatitis  -will need broad workup: autoimmune hepatitis panel and iron panel  -ceruloplasmin low, will need 24 hour copper study  -hepatitis B, and C negative -AST and ALT are within normal limits  -ultrasound of liver suggestive of portal hypertension and hepatitis  -will need broad workup: autoimmune hepatitis panel and iron panel  -ceruloplasmin low, f/u 24 hour urine copper study  -hepatitis B, and C negative

## 2019-02-24 LAB
ANION GAP SERPL CALC-SCNC: 9 MMOL/L — SIGNIFICANT CHANGE UP (ref 5–17)
BUN SERPL-MCNC: 5 MG/DL — LOW (ref 7–23)
CALCIUM SERPL-MCNC: 7.9 MG/DL — LOW (ref 8.4–10.5)
CHLORIDE SERPL-SCNC: 107 MMOL/L — SIGNIFICANT CHANGE UP (ref 96–108)
CO2 SERPL-SCNC: 26 MMOL/L — SIGNIFICANT CHANGE UP (ref 22–31)
CREAT SERPL-MCNC: 0.82 MG/DL — SIGNIFICANT CHANGE UP (ref 0.5–1.3)
GLUCOSE SERPL-MCNC: 129 MG/DL — HIGH (ref 70–99)
HCT VFR BLD CALC: 23.1 % — LOW (ref 39–50)
HGB BLD-MCNC: 7.5 G/DL — LOW (ref 13–17)
MAGNESIUM SERPL-MCNC: 1.9 MG/DL — SIGNIFICANT CHANGE UP (ref 1.6–2.6)
MCHC RBC-ENTMCNC: 30.7 PG — SIGNIFICANT CHANGE UP (ref 27–34)
MCHC RBC-ENTMCNC: 32.5 GM/DL — SIGNIFICANT CHANGE UP (ref 32–36)
MCV RBC AUTO: 94.7 FL — SIGNIFICANT CHANGE UP (ref 80–100)
NRBC # BLD: 0 /100 WBCS — SIGNIFICANT CHANGE UP (ref 0–0)
PLATELET # BLD AUTO: 68 K/UL — LOW (ref 150–400)
POTASSIUM SERPL-MCNC: 3.7 MMOL/L — SIGNIFICANT CHANGE UP (ref 3.5–5.3)
POTASSIUM SERPL-SCNC: 3.7 MMOL/L — SIGNIFICANT CHANGE UP (ref 3.5–5.3)
RBC # BLD: 2.44 M/UL — LOW (ref 4.2–5.8)
RBC # FLD: 17.2 % — HIGH (ref 10.3–14.5)
SODIUM SERPL-SCNC: 142 MMOL/L — SIGNIFICANT CHANGE UP (ref 135–145)
WBC # BLD: 2.93 K/UL — LOW (ref 3.8–10.5)
WBC # FLD AUTO: 2.93 K/UL — LOW (ref 3.8–10.5)

## 2019-02-24 PROCEDURE — 99233 SBSQ HOSP IP/OBS HIGH 50: CPT | Mod: GC

## 2019-02-24 RX ORDER — POTASSIUM CHLORIDE 20 MEQ
40 PACKET (EA) ORAL ONCE
Qty: 0 | Refills: 0 | Status: COMPLETED | OUTPATIENT
Start: 2019-02-24 | End: 2019-02-24

## 2019-02-24 RX ORDER — ACETAMINOPHEN 500 MG
325 TABLET ORAL EVERY 8 HOURS
Qty: 0 | Refills: 0 | Status: DISCONTINUED | OUTPATIENT
Start: 2019-02-24 | End: 2019-02-25

## 2019-02-24 RX ORDER — MAGNESIUM SULFATE 500 MG/ML
2 VIAL (ML) INJECTION ONCE
Qty: 0 | Refills: 0 | Status: COMPLETED | OUTPATIENT
Start: 2019-02-24 | End: 2019-02-24

## 2019-02-24 RX ADMIN — PANTOPRAZOLE SODIUM 40 MILLIGRAM(S): 20 TABLET, DELAYED RELEASE ORAL at 05:09

## 2019-02-24 RX ADMIN — Medication 40 MILLIEQUIVALENT(S): at 10:55

## 2019-02-24 RX ADMIN — Medication 325 MILLIGRAM(S): at 12:31

## 2019-02-24 RX ADMIN — Medication 325 MILLIGRAM(S): at 21:41

## 2019-02-24 RX ADMIN — Medication 325 MILLIGRAM(S): at 13:35

## 2019-02-24 RX ADMIN — PANTOPRAZOLE SODIUM 40 MILLIGRAM(S): 20 TABLET, DELAYED RELEASE ORAL at 18:02

## 2019-02-24 RX ADMIN — CEFTRIAXONE 100 GRAM(S): 500 INJECTION, POWDER, FOR SOLUTION INTRAMUSCULAR; INTRAVENOUS at 12:32

## 2019-02-24 RX ADMIN — OCTREOTIDE ACETATE 8 MICROGRAM(S)/HR: 200 INJECTION, SOLUTION INTRAVENOUS; SUBCUTANEOUS at 03:44

## 2019-02-24 RX ADMIN — Medication 50 GRAM(S): at 10:55

## 2019-02-24 RX ADMIN — Medication 325 MILLIGRAM(S): at 22:05

## 2019-02-24 NOTE — PROGRESS NOTE ADULT - ATTENDING COMMENTS
Patient seen and examined with house-staff during bedside rounds.  Resident note read, including vitals, physical findings, laboratory data, and radiological reports.   Revisions included below.  Direct personal management at bed side and extensive interpretation of the data.  Plan was outlined and discussed in details with the housestaff.  Decision making of high complexity  Action taken for acute disease activity to reflect the level of care provided:  - medication reconciliation  - review laboratory data  Diet bleed portal hypertension liver cirrhosis anemia thrombocytopenia    Hemoglobin is stable    Last day of octreotide    PPI. Patient is for endoscopy tomorrow. Discussed condition in detail to the patient and replace electrolyte abnormality
Patient seen and examined with house-staff during bedside rounds.  Resident note read, including vitals, physical findings, laboratory data, and radiological reports.   Revisions included below.  Direct personal management at bed side and extensive interpretation of the data.  Plan was outlined and discussed in details with the housestaff.  Decision making of high complexity  Action taken for acute disease activity to reflect the level of care provided:  - medication reconciliation  - review laboratory data  Upper GI bleed variceal secondary to portal hypertension with anemia due to GI loss thrombocytopenia  Hb stable  on octeotride  on PPI  scd  on liquids  hemodynamically stable
Reviewed above with Dr. Hudson and with patient. To follow serial CBC and Q 15 minute BP for now. HR has been a good indicator of his volume status.  Critical care time rendered 70 minutes
Abdominal U/S c/w portal hypertension and chronic liver disease from inflammation, steatosis or cirrhosis. Ultimately will need further w/u including possibly MRI and/or liver biopsy. For now will transfuse if hgb<7, continue octreotide 72 hours.

## 2019-02-24 NOTE — PROGRESS NOTE ADULT - PROBLEM SELECTOR PLAN 4
-patient has a history of drinking 8-9 drinks on the weekends  -current CIWA 0, no need for further CIWAs

## 2019-02-24 NOTE — PROGRESS NOTE ADULT - PROBLEM SELECTOR PROBLEM 1
Bleeding esophageal varices, unspecified esophageal varices type

## 2019-02-24 NOTE — PROGRESS NOTE ADULT - PROBLEM SELECTOR PLAN 3
RESOLVED  -Patient w/ WBC of 18 upon admission. Low concern for active infection. Patient afebrile. Likely reactive in the setting of GI bleed.   -WBC no logner elevated.   -continue to follow

## 2019-02-24 NOTE — PROGRESS NOTE ADULT - PROBLEM SELECTOR PROBLEM 6
Transition of care performed with sharing of clinical summary
Nutrition, metabolism, and development symptoms

## 2019-02-24 NOTE — PROGRESS NOTE ADULT - PROBLEM SELECTOR PLAN 1
-Patient presenting w/ episode of hematemesis x2 and melena.   -EGD showed esophageal varices, with post 5 clips  -patient had a total of 7 units of pRBC and 5 L NS  -octreotide at infusion rate of 40 micrograms/hour for a total of 72 hours  -protonix 40 mg iv BID  -will start w/u for occult liver disease: abdominal ultrasound with doppler shows dilated hepatic vein, with coarse findings on liver, and portal hypertension.  -autoimmune panel for hepatitis  -Maintain active type and screen, two large bore IVs  -if becomes tachycardic or HD unstable, stat GI consult  - Hb on 2/23 stable

## 2019-02-24 NOTE — PROGRESS NOTE ADULT - PROBLEM SELECTOR PLAN 6
F: LR at 50 cc/hr  E: Replete PRN  N: CLD
F: LR at 50 cc/hr  E: Replete PRN  N: CLD
F: LR at 50 cc/hr  E: Replete PRN  N: NPO past midnight for EGD tomorrow, CLD now.
1) PCP Contacted on Admission: (Y/N) --> Name & Phone #:  2) Date of Contact with PCP:  3) PCP Contacted at Discharge: (Y/N, N/A)  4) Summary of Handoff Given to PCP:   5) Post-Discharge Appointment Date and Location:

## 2019-02-24 NOTE — PROGRESS NOTE ADULT - ASSESSMENT
38 year old male with hematemesis and melena with symptomatic anemia noted to have drop in hgb concern for UGIB. EGD performed demonstrating multiple F3 and F2 varices s/p variceal banding. Unclear etiology of patients cirrhosis as pt denies family hx of liver disease, and did not demonstrate stigmata of liver disease. Pt reports binge drinking over the weekend, but denies hx of alcoholism which is typically associated with decompensated liver disease.     #Variceal Bleed due to cirrhosis of unclear origin,  -Cont octreotide for a total of 72 hours   -Ceftriaxone for SBP ppx  -IV PPI BID   -Hepatitis serologies neg  -ELENA, ASMA, AMA, Anti-lkm, IgG pending  -also recommend lipids, A1c  -Ceruloplasmin low, so please send off 24 hr urine copper, to r/o bonita disease  -alpha-1 antitrypsin normal   -RUQ sono with doppler shows patency of visualized portal and hepatic vasculature   - iron studies  c/w DIOR  -Obtain diagnostic para of any tappable ascites (please ensure plts >50k)  -Can advance diet to clears   -Restrictive transfusion (hgb <7)- pt received 1 unit prbc, 1 unit plt on Saturday, vitals sign stable, no evidence of further GIB  -If any evidence of further decompensation, please transfer to MICU for bedside endoscopy   -Can plan for repeat EGD on Monday to complete examination as fundus was not cleared due to clot burden   -if liver workup negative, may be 2.2 non cirrhotic portal hypertension      GI Following closely 38 year old male with hematemesis and melena with symptomatic anemia noted to have drop in hgb concern for UGIB. EGD performed demonstrating multiple F3 and F2 varices s/p variceal banding. Unclear etiology of patients cirrhosis as pt denies family hx of liver disease, and did not demonstrate stigmata of liver disease. Pt reports binge drinking over the weekend, but denies hx of alcoholism which is typically associated with decompensated liver disease.     #Variceal Bleed due to cirrhosis of unclear origin,  -Cont octreotide for a total of 72 hours   -Ceftriaxone for SBP ppx  -IV PPI BID   -Hepatitis serologies neg  -ELENA, ASMA, AMA, Anti-lkm, IgG pending  -also recommend lipids, A1c  -Ceruloplasmin low, so please send off 24 hr urine copper, to r/o bonita disease  -alpha-1 antitrypsin normal   -RUQ sono with doppler shows patency of visualized portal and hepatic vasculature   - iron studies  c/w DIOR  -Obtain diagnostic para of any tappable ascites (please ensure plts >50k)  -Can advance diet to clears   -Restrictive transfusion (hgb <7)- pt received 1 unit prbc, 1 unit plt on Saturday, vitals sign stable, no evidence of further GIB  -If any evidence of further decompensation, please transfer to MICU for bedside endoscopy   -Can plan for repeat EGD on Monday to complete examination as fundus was not cleared due to clot burden, NPO at midnight tonight  -if liver workup negative, may be 2.2 non cirrhotic portal hypertension      GI Following closely 38 year old male with hematemesis and melena with symptomatic anemia noted to have drop in hgb concern for UGIB. EGD performed demonstrating multiple F3 and F2 varices s/p variceal banding. Unclear etiology of patients cirrhosis as pt denies family hx of liver disease, and did not demonstrate stigmata of liver disease. Pt reports binge drinking over the weekend, but denies hx of alcoholism which is typically associated with decompensated liver disease.     #Variceal Bleed due to portal hypertension of unclear origin  -Cont octreotide for a total of 72 hours   -Ceftriaxone for SBP ppx  -IV PPI BID   -Hepatitis serologies neg  -ELENA, ASMA, AMA, Anti-lkm, IgG pending  -also recommend lipids, A1c  -Ceruloplasmin low, so please send off 24 hr urine copper, to r/o bonita disease  -alpha-1 antitrypsin normal   -RUQ sono with doppler shows patency of visualized portal and hepatic vasculature   - iron studies  c/w DIOR  -Obtain diagnostic para of any tappable ascites (please ensure plts >50k)  -Can advance diet to clears   -Restrictive transfusion (hgb <7)- pt received 1 unit prbc, 1 unit plt on Saturday, vitals sign stable, no evidence of further GIB  -If any evidence of further decompensation, please transfer to MICU for bedside endoscopy   -Can plan for repeat EGD on Monday to complete examination as fundus was not cleared due to clot burden, NPO at midnight tonight  -if liver workup negative, may be 2.2 non cirrhotic portal hypertension      GI Following closely 38 year old male with hematemesis and melena with symptomatic anemia noted to have drop in hgb concern for UGIB. EGD performed demonstrating multiple F3 and F2 varices s/p variceal banding. Unclear etiology of patients cirrhosis as pt denies family hx of liver disease, and did not demonstrate stigmata of liver disease. Pt reports binge drinking over the weekend, but denies hx of alcoholism which is typically associated with decompensated liver disease.     #Variceal Bleed due to portal hypertension/cirrhosis of unclear origin  -Cont octreotide for a total of 72 hours   -Ceftriaxone for SBP ppx  -IV PPI BID   -Hepatitis serologies neg  -ELENA, ASMA, AMA, Anti-lkm, IgG pending  -also recommend checking lipids. A1c wnl  -Ceruloplasmin low, so please send off 24 hr urine copper, to r/o bonita disease  -alpha-1 antitrypsin normal   -RUQ sono with doppler shows patency of visualized portal and hepatic vasculature   - iron studies  c/w DIOR  -Obtain diagnostic para of any tappable ascites (please ensure plts >50k)  -Can advance diet    -Restrictive transfusion (hgb <7)- pt received 1 unit prbc, 1 unit plt on Saturday, vitals sign stable, no evidence of further GIB  -If any evidence of further decompensation, please transfer to MICU for bedside endoscopy   -Can plan for repeat EGD on Monday to complete examination as fundus was not cleared due to clot burden, NPO at midnight tonight        GI Following closely 38 year old male with hematemesis and melena with symptomatic anemia noted to have drop in hgb concern for UGIB. EGD performed demonstrating multiple F3 and F2 varices s/p variceal banding. Unclear etiology of patients cirrhosis as pt denies family hx of liver disease, and did not demonstrate stigmata of liver disease. Pt reports binge drinking over the weekend, but denies hx of alcoholism which is typically associated with decompensated liver disease.     #Variceal Bleed due to portal hypertension/cirrhosis of unclear origin  -Cont octreotide for a total of 72 hours   -Ceftriaxone for SBP ppx  -IV PPI BID   -Hepatitis serologies neg  -ELENA, ASMA, AMA, Anti-lkm, IgG pending  -also recommend checking lipids. A1c wnl  -Ceruloplasmin low, so please send off 24 hr urine copper, to r/o bonita disease  -alpha-1 antitrypsin normal   -RUQ sono with doppler shows patency of visualized portal and hepatic vasculature   - iron studies  c/w DIOR  -Obtain diagnostic para of any tappable ascites (please ensure plts >50k)  -on clears  -Restrictive transfusion (hgb <7)- pt received 1 unit prbc, 1 unit plt on Saturday, vitals sign stable, no evidence of further GIB  -If any evidence of further decompensation, please transfer to MICU for bedside endoscopy   -Can plan for repeat EGD on Monday to complete examination as fundus was not cleared due to clot burden, NPO at midnight tonight        GI Following closely

## 2019-02-24 NOTE — PROGRESS NOTE ADULT - SUBJECTIVE AND OBJECTIVE BOX
INTERVAL HPI/OVERNIGHT EVENTS: No acute events overnight. HD stable    Vital Signs Last 24 Hrs  T(C): 36.6 (24 Feb 2019 13:26), Max: 37.1 (23 Feb 2019 18:19)  T(F): 97.9 (24 Feb 2019 13:26), Max: 98.7 (23 Feb 2019 18:19)  HR: 80 (24 Feb 2019 12:40) (64 - 90)  BP: 96/54 (24 Feb 2019 12:40) (91/45 - 115/57)  BP(mean): 72 (24 Feb 2019 12:40) (65 - 79)  RR: 18 (24 Feb 2019 12:40) (17 - 20)  SpO2: 97% (24 Feb 2019 12:40) (94% - 97%)      23 Feb 2019 07:01  -  24 Feb 2019 07:00  --------------------------------------------------------  IN:    octreotide  Infusion: 88 mL  Total IN: 88 mL    OUT:    Voided: 700 mL  Total OUT: 700 mL    Total NET: -612 mL      PHYSICAL EXAM:    Constitutional: Young  man sitting in bed comfortably, looks tired, pale	  Head: NC/AT  Eyes: PERRL, EOMI, pale conjunctiva   ENT: moist mucous membranes, good dentition, no oral ulcers  Neck: supple; no JVD   Respiratory: CTA B/L, breathing comfortably on room air. No wheezes, rales, or rhonchi  Cardiac: +S1/S2; tachycardic, no murmurs, rubs, or gallops  Gastrointestinal: Soft, non-tender, non-distended, normal bowel sounds 	  Extremities: WWP, no clubbing or cyanosis; no peripheral edema  Musculoskeletal: NROM x4; no joint swelling, tenderness or erythema  Vascular: 2+ DP pulses   Dermatologic: skin warm, dry and intact, pale  Neurologic: AAOx3; CNII-XII grossly intact; no focal deficits    MEDICATIONS  (STANDING):  cefTRIAXone   IVPB 1 Gram(s) IV Intermittent every 24 hours  influenza   Vaccine 0.5 milliLiter(s) IntraMuscular once  octreotide  Infusion 40 MICROgram(s)/Hr (8 mL/Hr) IV Continuous <Continuous>  pantoprazole  Injectable 40 milliGRAM(s) IV Push every 12 hours    MEDICATIONS  (PRN):  acetaminophen   Tablet .. 325 milliGRAM(s) Oral every 8 hours PRN Mild Pain (1 - 3)    ALLERGIES:  Allergies  No Known Allergies  Intolerances      .  LABS:                         7.5    2.93  )-----------( 68       ( 24 Feb 2019 07:44 )             23.1     02-24    142  |  107  |  5<L>  ----------------------------<  129<H>  3.7   |  26  |  0.82    Ca    7.9<L>      24 Feb 2019 07:44  Phos  2.6     02-23  Mg     1.9     02-24    TPro  4.4<L>  /  Alb  2.8<L>  /  TBili  0.8  /  DBili  x   /  AST  14  /  ALT  10  /  AlkPhos  26<L>  02-23    PT/INR - ( 23 Feb 2019 06:17 )   PT: 12.2 sec;   INR: 1.08          PTT - ( 23 Feb 2019 06:17 )  PTT:26.6 sec              RADIOLOGY, EKG & ADDITIONAL TESTS: Reviewed.

## 2019-02-24 NOTE — PROGRESS NOTE ADULT - PROBLEM SELECTOR PLAN 2
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

## 2019-02-24 NOTE — PROGRESS NOTE ADULT - PROBLEM SELECTOR PROBLEM 5
Nutrition, metabolism, and development symptoms
Prophylactic measure

## 2019-02-24 NOTE — PROGRESS NOTE ADULT - SUBJECTIVE AND OBJECTIVE BOX
Pt seen and examined at bedside.  Pt had one nonbloody BM last night    PERTINENT REVIEW OF SYSTEMS:  CONSTITUTIONAL: No weakness, fevers or chills  HEENT: No visual changes; No vertigo or throat pain   GASTROINTESTINAL: No abdominal or epigastric pain. No nausea, vomiting, or hematemesis; No diarrhea or constipation. No melena or hematochezia.  NEUROLOGICAL: No numbness or weakness  SKIN: No itching, burning, rashes, or lesions     Allergies    No Known Allergies    Intolerances      MEDICATIONS:  MEDICATIONS  (STANDING):  cefTRIAXone   IVPB 1 Gram(s) IV Intermittent every 24 hours  influenza   Vaccine 0.5 milliLiter(s) IntraMuscular once  octreotide  Infusion 40 MICROgram(s)/Hr (8 mL/Hr) IV Continuous <Continuous>  pantoprazole  Injectable 40 milliGRAM(s) IV Push every 12 hours    MEDICATIONS  (PRN):    Vital Signs Last 24 Hrs  T(C): 36.8 (24 Feb 2019 06:09), Max: 37.7 (23 Feb 2019 09:47)  T(F): 98.2 (24 Feb 2019 06:09), Max: 99.9 (23 Feb 2019 09:47)  HR: 64 (24 Feb 2019 06:11) (64 - 90)  BP: 93/50 (24 Feb 2019 06:11) (91/45 - 115/57)  BP(mean): 70 (23 Feb 2019 23:30) (65 - 79)  RR: 18 (24 Feb 2019 06:11) (17 - 20)  SpO2: 94% (24 Feb 2019 06:11) (94% - 100%)    02-23 @ 07:01  -  02-24 @ 07:00  --------------------------------------------------------  IN: 88 mL / OUT: 700 mL / NET: -612 mL      PHYSICAL EXAM:    General: Well developed; well nourished; in no acute distress  HEENT: MMM, conjunctiva and sclera clear  Gastrointestinal: Soft non-tender non-distended; Normal bowel sounds; No hepatosplenomegaly. No rebound or guarding  Skin: Warm and dry. No obvious rash    LABS:                        7.4    3.85  )-----------( 64       ( 23 Feb 2019 20:14 )             22.6     02-23    142  |  110<H>  |  6<L>  ----------------------------<  124<H>  3.4<L>   |  25  |  0.72    Ca    7.7<L>      23 Feb 2019 06:17  Phos  2.6     02-23  Mg     1.8     02-23    TPro  4.4<L>  /  Alb  2.8<L>  /  TBili  0.8  /  DBili  x   /  AST  14  /  ALT  10  /  AlkPhos  26<L>  02-23    PT/INR - ( 23 Feb 2019 06:17 )   PT: 12.2 sec;   INR: 1.08          PTT - ( 23 Feb 2019 06:17 )  PTT:26.6 sec                  RADIOLOGY & ADDITIONAL STUDIES: Pt seen and examined at bedside.  Pt had one nonbloody BM last night    PERTINENT REVIEW OF SYSTEMS:  CONSTITUTIONAL: No weakness, fevers or chills  HEENT: No visual changes; No vertigo or throat pain   GASTROINTESTINAL: No abdominal or epigastric pain. No nausea, vomiting, or hematemesis; No diarrhea or constipation. No melena or hematochezia.  NEUROLOGICAL: No numbness or weakness  SKIN: No itching, burning, rashes, or lesions     Allergies    No Known Allergies    Intolerances      MEDICATIONS:  MEDICATIONS  (STANDING):  cefTRIAXone   IVPB 1 Gram(s) IV Intermittent every 24 hours  influenza   Vaccine 0.5 milliLiter(s) IntraMuscular once  octreotide  Infusion 40 MICROgram(s)/Hr (8 mL/Hr) IV Continuous <Continuous>  pantoprazole  Injectable 40 milliGRAM(s) IV Push every 12 hours    MEDICATIONS  (PRN):    Vital Signs Last 24 Hrs  T(C): 36.8 (24 Feb 2019 06:09), Max: 37.7 (23 Feb 2019 09:47)  T(F): 98.2 (24 Feb 2019 06:09), Max: 99.9 (23 Feb 2019 09:47)  HR: 64 (24 Feb 2019 06:11) (64 - 90)  BP: 93/50 (24 Feb 2019 06:11) (91/45 - 115/57)  BP(mean): 70 (23 Feb 2019 23:30) (65 - 79)  RR: 18 (24 Feb 2019 06:11) (17 - 20)  SpO2: 94% (24 Feb 2019 06:11) (94% - 100%)    02-23 @ 07:01  -  02-24 @ 07:00  --------------------------------------------------------  IN: 88 mL / OUT: 700 mL / NET: -612 mL      PHYSICAL EXAM:    General: Well developed; well nourished; in no acute distress  HEENT: MMM, conjunctiva and sclera clear  Gastrointestinal: Soft non-tender non-distended; Normal bowel sounds; No hepatosplenomegaly. No rebound or guarding  Skin: Warm and dry. No obvious rash  Neuro: AAOX3, no asterixis    LABS:                        7.4    3.85  )-----------( 64       ( 23 Feb 2019 20:14 )             22.6     02-23    142  |  110<H>  |  6<L>  ----------------------------<  124<H>  3.4<L>   |  25  |  0.72    Ca    7.7<L>      23 Feb 2019 06:17  Phos  2.6     02-23  Mg     1.8     02-23    TPro  4.4<L>  /  Alb  2.8<L>  /  TBili  0.8  /  DBili  x   /  AST  14  /  ALT  10  /  AlkPhos  26<L>  02-23    PT/INR - ( 23 Feb 2019 06:17 )   PT: 12.2 sec;   INR: 1.08          PTT - ( 23 Feb 2019 06:17 )  PTT:26.6 sec                  RADIOLOGY & ADDITIONAL STUDIES:

## 2019-02-24 NOTE — PROGRESS NOTE ADULT - PROVIDER SPECIALTY LIST ADULT
Gastroenterology
Internal Medicine

## 2019-02-24 NOTE — PROGRESS NOTE ADULT - PROBLEM SELECTOR PLAN 5
F: LR @100cc/hr   E: Replete PRN  N: NPO
PPX: SCDs, PPI   FULL CODE  DISPO: [id number]

## 2019-02-25 VITALS — TEMPERATURE: 98 F

## 2019-02-25 LAB
ALBUMIN SERPL ELPH-MCNC: 3 G/DL — LOW (ref 3.3–5)
ALP SERPL-CCNC: 30 U/L — LOW (ref 40–120)
ALT FLD-CCNC: 11 U/L — SIGNIFICANT CHANGE UP (ref 10–45)
ANION GAP SERPL CALC-SCNC: 9 MMOL/L — SIGNIFICANT CHANGE UP (ref 5–17)
AST SERPL-CCNC: 15 U/L — SIGNIFICANT CHANGE UP (ref 10–40)
BILIRUB SERPL-MCNC: 0.7 MG/DL — SIGNIFICANT CHANGE UP (ref 0.2–1.2)
BLD GP AB SCN SERPL QL: NEGATIVE — SIGNIFICANT CHANGE UP
BUN SERPL-MCNC: 5 MG/DL — LOW (ref 7–23)
CALCIUM SERPL-MCNC: 7.9 MG/DL — LOW (ref 8.4–10.5)
CHLORIDE SERPL-SCNC: 108 MMOL/L — SIGNIFICANT CHANGE UP (ref 96–108)
CO2 SERPL-SCNC: 25 MMOL/L — SIGNIFICANT CHANGE UP (ref 22–31)
CREAT SERPL-MCNC: 0.74 MG/DL — SIGNIFICANT CHANGE UP (ref 0.5–1.3)
GLUCOSE SERPL-MCNC: 111 MG/DL — HIGH (ref 70–99)
HCT VFR BLD CALC: 24.2 % — LOW (ref 39–50)
HGB BLD-MCNC: 8.1 G/DL — LOW (ref 13–17)
MAGNESIUM SERPL-MCNC: 2 MG/DL — SIGNIFICANT CHANGE UP (ref 1.6–2.6)
MCHC RBC-ENTMCNC: 31.4 PG — SIGNIFICANT CHANGE UP (ref 27–34)
MCHC RBC-ENTMCNC: 33.5 GM/DL — SIGNIFICANT CHANGE UP (ref 32–36)
MCV RBC AUTO: 93.8 FL — SIGNIFICANT CHANGE UP (ref 80–100)
NRBC # BLD: 0 /100 WBCS — SIGNIFICANT CHANGE UP (ref 0–0)
PHOSPHATE SERPL-MCNC: 3.7 MG/DL — SIGNIFICANT CHANGE UP (ref 2.5–4.5)
PLATELET # BLD AUTO: 68 K/UL — LOW (ref 150–400)
POTASSIUM SERPL-MCNC: 3.5 MMOL/L — SIGNIFICANT CHANGE UP (ref 3.5–5.3)
POTASSIUM SERPL-SCNC: 3.5 MMOL/L — SIGNIFICANT CHANGE UP (ref 3.5–5.3)
PROT SERPL-MCNC: 5.2 G/DL — LOW (ref 6–8.3)
RBC # BLD: 2.58 M/UL — LOW (ref 4.2–5.8)
RBC # FLD: 17.4 % — HIGH (ref 10.3–14.5)
RETICS #: 171.9 K/UL — HIGH (ref 25–125)
RETICS/RBC NFR: 6.7 % — HIGH (ref 0.5–2.5)
RH IG SCN BLD-IMP: POSITIVE — SIGNIFICANT CHANGE UP
SODIUM SERPL-SCNC: 142 MMOL/L — SIGNIFICANT CHANGE UP (ref 135–145)
WBC # BLD: 2.86 K/UL — LOW (ref 3.8–10.5)
WBC # FLD AUTO: 2.86 K/UL — LOW (ref 3.8–10.5)

## 2019-02-25 PROCEDURE — 83540 ASSAY OF IRON: CPT

## 2019-02-25 PROCEDURE — 96375 TX/PRO/DX INJ NEW DRUG ADDON: CPT

## 2019-02-25 PROCEDURE — 90686 IIV4 VACC NO PRSV 0.5 ML IM: CPT

## 2019-02-25 PROCEDURE — 71045 X-RAY EXAM CHEST 1 VIEW: CPT | Mod: 26

## 2019-02-25 PROCEDURE — 82787 IGG 1 2 3 OR 4 EACH: CPT

## 2019-02-25 PROCEDURE — 93975 VASCULAR STUDY: CPT

## 2019-02-25 PROCEDURE — 96374 THER/PROPH/DIAG INJ IV PUSH: CPT

## 2019-02-25 PROCEDURE — 86901 BLOOD TYPING SEROLOGIC RH(D): CPT

## 2019-02-25 PROCEDURE — 80048 BASIC METABOLIC PNL TOTAL CA: CPT

## 2019-02-25 PROCEDURE — 93005 ELECTROCARDIOGRAM TRACING: CPT

## 2019-02-25 PROCEDURE — P9016: CPT

## 2019-02-25 PROCEDURE — 80053 COMPREHEN METABOLIC PANEL: CPT

## 2019-02-25 PROCEDURE — 96376 TX/PRO/DX INJ SAME DRUG ADON: CPT

## 2019-02-25 PROCEDURE — 85730 THROMBOPLASTIN TIME PARTIAL: CPT

## 2019-02-25 PROCEDURE — 85027 COMPLETE CBC AUTOMATED: CPT

## 2019-02-25 PROCEDURE — 84466 ASSAY OF TRANSFERRIN: CPT

## 2019-02-25 PROCEDURE — 76700 US EXAM ABDOM COMPLETE: CPT

## 2019-02-25 PROCEDURE — 85045 AUTOMATED RETICULOCYTE COUNT: CPT

## 2019-02-25 PROCEDURE — 36415 COLL VENOUS BLD VENIPUNCTURE: CPT

## 2019-02-25 PROCEDURE — 86381 MITOCHONDRIAL ANTIBODY EACH: CPT

## 2019-02-25 PROCEDURE — 86923 COMPATIBILITY TEST ELECTRIC: CPT

## 2019-02-25 PROCEDURE — 83735 ASSAY OF MAGNESIUM: CPT

## 2019-02-25 PROCEDURE — 80074 ACUTE HEPATITIS PANEL: CPT

## 2019-02-25 PROCEDURE — 86900 BLOOD TYPING SEROLOGIC ABO: CPT

## 2019-02-25 PROCEDURE — 99285 EMERGENCY DEPT VISIT HI MDM: CPT | Mod: 25

## 2019-02-25 PROCEDURE — 85025 COMPLETE CBC W/AUTO DIFF WBC: CPT

## 2019-02-25 PROCEDURE — 82728 ASSAY OF FERRITIN: CPT

## 2019-02-25 PROCEDURE — P9035: CPT

## 2019-02-25 PROCEDURE — 84100 ASSAY OF PHOSPHORUS: CPT

## 2019-02-25 PROCEDURE — 99238 HOSP IP/OBS DSCHRG MGMT 30/<: CPT | Mod: GC

## 2019-02-25 PROCEDURE — 82525 ASSAY OF COPPER: CPT

## 2019-02-25 PROCEDURE — 85384 FIBRINOGEN ACTIVITY: CPT

## 2019-02-25 PROCEDURE — 85610 PROTHROMBIN TIME: CPT

## 2019-02-25 PROCEDURE — 83036 HEMOGLOBIN GLYCOSYLATED A1C: CPT

## 2019-02-25 PROCEDURE — 86850 RBC ANTIBODY SCREEN: CPT

## 2019-02-25 PROCEDURE — C9113: CPT

## 2019-02-25 PROCEDURE — 87389 HIV-1 AG W/HIV-1&-2 AB AG IA: CPT

## 2019-02-25 PROCEDURE — C1889: CPT

## 2019-02-25 PROCEDURE — 82390 ASSAY OF CERULOPLASMIN: CPT

## 2019-02-25 PROCEDURE — 86038 ANTINUCLEAR ANTIBODIES: CPT

## 2019-02-25 PROCEDURE — 36430 TRANSFUSION BLD/BLD COMPNT: CPT

## 2019-02-25 PROCEDURE — 83550 IRON BINDING TEST: CPT

## 2019-02-25 PROCEDURE — 81332 SERPINA1 GENE: CPT

## 2019-02-25 PROCEDURE — 86704 HEP B CORE ANTIBODY TOTAL: CPT

## 2019-02-25 PROCEDURE — 71045 X-RAY EXAM CHEST 1 VIEW: CPT

## 2019-02-25 PROCEDURE — 86255 FLUORESCENT ANTIBODY SCREEN: CPT

## 2019-02-25 RX ORDER — FERROUS SULFATE 325(65) MG
1 TABLET ORAL
Qty: 60 | Refills: 0 | OUTPATIENT
Start: 2019-02-25 | End: 2019-03-26

## 2019-02-25 RX ORDER — PANTOPRAZOLE SODIUM 20 MG/1
1 TABLET, DELAYED RELEASE ORAL
Qty: 60 | Refills: 0 | OUTPATIENT
Start: 2019-02-25 | End: 2019-03-26

## 2019-02-25 RX ORDER — POTASSIUM CHLORIDE 20 MEQ
40 PACKET (EA) ORAL ONCE
Qty: 0 | Refills: 0 | Status: COMPLETED | OUTPATIENT
Start: 2019-02-25 | End: 2019-02-25

## 2019-02-25 RX ADMIN — PANTOPRAZOLE SODIUM 40 MILLIGRAM(S): 20 TABLET, DELAYED RELEASE ORAL at 18:49

## 2019-02-25 RX ADMIN — CEFTRIAXONE 100 GRAM(S): 500 INJECTION, POWDER, FOR SOLUTION INTRAMUSCULAR; INTRAVENOUS at 15:00

## 2019-02-25 RX ADMIN — Medication 40 MILLIEQUIVALENT(S): at 15:04

## 2019-02-25 RX ADMIN — PANTOPRAZOLE SODIUM 40 MILLIGRAM(S): 20 TABLET, DELAYED RELEASE ORAL at 06:57

## 2019-02-25 RX ADMIN — INFLUENZA VIRUS VACCINE 0.5 MILLILITER(S): 15; 15; 15; 15 SUSPENSION INTRAMUSCULAR at 17:05

## 2019-02-25 NOTE — DISCHARGE NOTE ADULT - PLAN OF CARE
Please follow up with [facility] liver specialist Dr. Jessy Dejesus You were found to have variceal bleeding. We are unsure right now, as you do not drink too much alcohol. Genetic disease, inflammatory disease, and infections are under the differential. You were given 5 units of packed red blood cells and are now stable. You had 2 endoscopies and had 5 clips placed. You will need to follow up with Dr. Dejesus March 5, a liver transplant specialist. You will need endoscopy again in 2 weeks, and call Dr. Fernando Hudson if unable to get it scheduled with anyone else. Please take protonix 40 mg once in the morning and once in the evening. Please take iron once in the morning and once in the evening. If you notice any blood in your stool or feel lightheaded, please You were found to have variceal bleeding. We are unsure right now, as you do not drink too much alcohol. Genetic disease, inflammatory disease, and infections are under the differential. You were given 5 units of packed red blood cells and are now stable. You had 2 endoscopies and had 5 clips placed. You will need to follow up with Dr. Dejesus March 5, a liver transplant specialist. You will need endoscopy again in 2 weeks, and call Dr. Fernando Hudson if unable to get it scheduled with anyone else. Please take protonix 40 mg once in the morning and once in the evening. Please take iron once in the morning and once in the evening. If you notice any blood in your stool or feel lightheaded, please go to the emergency department. If you are unable to get an endoscopy within 2 weeks, please reach out to Dr. Fernando Hudson. You will need propranolol and will discuss on an outpatient setting on Tuesday March 5. Your echocardiogram was concerning for hepatitis. This is likely from inflammatory vs autoimmune condition. Please obtain records of your blood work results in 2 weeks to get the most updated results. You did not have hemachromatosis gene study sent. You had right upper quadrant ultrasound that showed hepatitis.

## 2019-02-25 NOTE — DISCHARGE NOTE ADULT - PATIENT PORTAL LINK FT
You can access the [facility] Patient Portal, offered by [facility], by registering with the following website: [email or web address]

## 2019-02-25 NOTE — DISCHARGE NOTE ADULT - PROVIDER TOKENS
FREE:[[facility]PHONE:[(   )    -],FAX:[(   )    -],ADDRESS:[[address] [place] [address]]]

## 2019-02-25 NOTE — DISCHARGE NOTE ADULT - CARE PLAN
Principal Discharge DX:	Bleeding esophageal varices, unspecified esophageal varices type  Goal:	Please follow up with [facility] liver specialist Dr. Jessy Dejesus  Assessment and plan of treatment:	You were found to have variceal bleeding. We are unsure right now, as you do not drink too much alcohol. Genetic disease, inflammatory disease, and infections are under the differential. You were given 5 units of packed red blood cells and are now stable. You had 2 endoscopies and had 5 clips placed. You will need to follow up with Dr. Dejesus March 5, a liver transplant specialist. You will need endoscopy again in 2 weeks, and call Dr. Fernando Hudson if unable to get it scheduled with anyone else. Please take protonix 40 mg once in the morning and once in the evening. Please take iron once in the morning and once in the evening. If you notice any blood in your stool or feel lightheaded, please  Secondary Diagnosis:	Hepatitis Principal Discharge DX:	Bleeding esophageal varices, unspecified esophageal varices type  Goal:	Please follow up with [facility] liver specialist Dr. Jessy Dejesus  Assessment and plan of treatment:	You were found to have variceal bleeding. We are unsure right now, as you do not drink too much alcohol. Genetic disease, inflammatory disease, and infections are under the differential. You were given 5 units of packed red blood cells and are now stable. You had 2 endoscopies and had 5 clips placed. You will need to follow up with Dr. Dejesus March 5, a liver transplant specialist. You will need endoscopy again in 2 weeks, and call Dr. Fernando Hudson if unable to get it scheduled with anyone else. Please take protonix 40 mg once in the morning and once in the evening. Please take iron once in the morning and once in the evening. If you notice any blood in your stool or feel lightheaded, please go to the emergency department. If you are unable to get an endoscopy within 2 weeks, please reach out to Dr. Fernando Hudson. You will need propranolol and will discuss on an outpatient setting on Tuesday March 5.  Secondary Diagnosis:	Hepatitis  Assessment and plan of treatment:	Your echocardiogram was concerning for hepatitis. This is likely from inflammatory vs autoimmune condition. Please obtain records of your blood work results in 2 weeks to get the most updated results. You did not have hemachromatosis gene study sent. You had right upper quadrant ultrasound that showed hepatitis.

## 2019-02-25 NOTE — DISCHARGE NOTE ADULT - MEDICATION SUMMARY - MEDICATIONS TO TAKE
I will START or STAY ON the medications listed below when I get home from the hospital:    FeroSul 325 mg (65 mg elemental iron) oral tablet  -- 1 tab(s) by mouth 2 times a day once in the morning and once in the evening.   -- Check with your doctor before becoming pregnant.  Do not chew, break, or crush.  May discolor urine or feces.    -- Indication: For iron deficiency anemia    Protonix 40 mg oral delayed release tablet  -- 1 tab(s) by mouth 2 times a day for the first 2 weeks, then once a day after.   -- It is very important that you take or use this exactly as directed.  Do not skip doses or discontinue unless directed by your doctor.  Obtain medical advice before taking any non-prescription drugs as some may affect the action of this medication.  Swallow whole.  Do not crush.    -- Indication: For Upper GI bleeding

## 2019-02-25 NOTE — DISCHARGE NOTE ADULT - HOSPITAL COURSE
37 y/o M w/ no significant PMHx presenting with episode of hematemesis and melena. Had 2 episodes of hematemesis prior to arrival. Patient reports that shortly thereafter he began having loose dark stools without BRBPR. Given these symptoms patient reports that he presented to [facility]. Patient also reports associated weakness, fatigue and abdominal soreness. Reports that he drank 8-9 alcoholic beverages during the weekend but had no episodes of nausea/vomiting afterwards, denies heavy alcohol use. Denies systemic joint pain or fevers. Patient had hemoglobin of 6.4, and tachycardia 110+ and needed 4 units of packed RBC on initial arrival with 3 L NS bolus due to HD unstable. Had urgent endoscopy and had active variceal bleeding and needed 5 clips during that time. During the next day, patient continued to have darker stool and blood in bowel movements. Patient needed another unit of blood for decreased pRBC and Hg was stable afterwards. Was on protonix iv BID and also octreotide for 72 hours total. RUQ ultrasound showed borderline dilatation of main portal vein, up to 14 mm in diameter, reversal of flow for possible portal vein hypertension. Coarse hepatic echotexture for chronic hepatitis/cirrhosis seem. Recommended for MR hepatic protocol. Patient was having autoimmune workup for hepatitis, ferritin was elevated and cerruloplasmin was decreased. 24 hour cerruloplasmin was collected. Pending further autoimmune hepatitis panel. Repeat EGD unremarkable for further bleeding. Patient will be discharged and follow up as an outpatient at [facility] liver transplant.

## 2019-02-25 NOTE — DISCHARGE NOTE ADULT - ADDITIONAL INSTRUCTIONS
Dr. Jessy Dejesus liver transplant physician at Tuesday March 5 at 8:30 am [address] [place] [address] [phone].

## 2019-02-26 LAB
MITOCHONDRIA AB SER-ACNC: SIGNIFICANT CHANGE UP
SMOOTH MUSCLE AB SER-ACNC: SIGNIFICANT CHANGE UP

## 2019-02-27 LAB — ANA TITR SER: NEGATIVE — SIGNIFICANT CHANGE UP

## 2019-02-28 DIAGNOSIS — K76.6 PORTAL HYPERTENSION: ICD-10-CM

## 2019-02-28 DIAGNOSIS — D69.6 THROMBOCYTOPENIA, UNSPECIFIED: ICD-10-CM

## 2019-02-28 DIAGNOSIS — K31.9 DISEASE OF STOMACH AND DUODENUM, UNSPECIFIED: ICD-10-CM

## 2019-02-28 DIAGNOSIS — K74.60 UNSPECIFIED CIRRHOSIS OF LIVER: ICD-10-CM

## 2019-02-28 DIAGNOSIS — D62 ACUTE POSTHEMORRHAGIC ANEMIA: ICD-10-CM

## 2019-02-28 DIAGNOSIS — D72.829 ELEVATED WHITE BLOOD CELL COUNT, UNSPECIFIED: ICD-10-CM

## 2019-02-28 DIAGNOSIS — K75.9 INFLAMMATORY LIVER DISEASE, UNSPECIFIED: ICD-10-CM

## 2019-02-28 DIAGNOSIS — I85.11 SECONDARY ESOPHAGEAL VARICES WITH BLEEDING: ICD-10-CM

## 2019-02-28 LAB
COPPER ?TM UR-MCNC: 60 — SIGNIFICANT CHANGE UP (ref 15–60)
COPPER UR-MCNC: 5000 ML — SIGNIFICANT CHANGE UP

## 2019-03-01 LAB
IGG SERPL-MCNC: 395 MG/DL — LOW (ref 700–1600)
IGG1 SER-MCNC: 235 MG/DL — LOW (ref 248–810)
IGG2 SER-MCNC: 160 MG/DL — SIGNIFICANT CHANGE UP (ref 130–555)
IGG3 SER-MCNC: 33 MG/DL — SIGNIFICANT CHANGE UP (ref 15–102)
IGG4 SER-MCNC: 43 MG/DL — SIGNIFICANT CHANGE UP (ref 2–96)

## 2020-02-13 NOTE — ED PROVIDER NOTE - CADM POA URETHRAL CATHETER
No
no paroxysmal nocturnal dyspnea/no peripheral edema/no palpitations/no dyspnea on exertion/no claudication/no orthopnea/no chest pain

## 2021-07-15 NOTE — H&P ADULT - DOES THIS PATIENT HAVE A HISTORY OF OR HAS BEEN DX WITH HEART FAILURE?
[place] [id number]  coding opportunities             Chart reviewed, (number of) suggestions sent to provider: 2            Number of suggestions actually used: 2      Number of suggestions NOT actually used: 0     Patients insurance company: [employer] (Medicare Advantage and Commercial)     Visit status: Patient arrived for their scheduled appointment     Provider never responded to [place] [phone]  coding request     [place] [phone]  coding opportunities             Chart reviewed, (number of) suggestions sent to provider: 2                  Patients insurance company: [employer] (Medicare Advantage and [employer]           1) C50 911 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 [facility])  -----Per Coding guidelines:   If the primary site of the malignancy as previously excised or eradicated by treatment and the original primary site has not recurred and is no longer under treatment, code the previous primary site as "personal (past) history of malignant neoplasm," using the appropriate subcategory code under Z85  Code any mention of current secondary sites        If the patient is still under active treatment for malignancy of primary site, either radiation or chemotherapy, retain the code for malignancy of primary site   The patient would not be receiving radiation therapy or chemotherapy directed at the primary site unless further treatments were needed     2) Found on active problem list - please assess using [employer] for 2021 billing  E66 01 Obesity no

## 2024-06-17 NOTE — PROVIDER CONTACT NOTE (CRITICAL VALUE NOTIFICATION) - NS PROVIDER READ BACK
"Subjective:     Ignacio Pryor is a 4 y.o. female who is brought in for this well child visit.  History provided by: mother    Current Issues:  Reaction to dog saliva - Recommend Zyrtec OTC when going to grandparents house    Well Child Assessment:  History was provided by the mother. Ignacio lives with her mother and father.   Nutrition  Types of intake include cereals, eggs, fruits, meats, vegetables and fish.   Dental  Patient has a dental home: brush teeth.   Elimination  Elimination problems do not include constipation.   Sleep  The patient sleeps in her own bed. There are no sleep problems.   Safety  Home is child-proofed? yes. There is no smoking in the home. Home has working smoke alarms? yes. Home has working carbon monoxide alarms? yes. There is an appropriate car seat in use.   Screening  Immunizations are up-to-date. There are no risk factors for hearing loss. There are no risk factors for anemia. There are no risk factors for tuberculosis. There are no risk factors for apnea.   Social  The caregiver enjoys the child. Childcare is provided at child's home. The childcare provider is a parent.       The following portions of the patient's history were reviewed and updated as appropriate: allergies, current medications, past family history, past medical history, past social history, past surgical history, and problem list.    Developmental 18 Months Appropriate     Question Response Comments    If ball is rolled toward child, child will roll it back (not hand it back) Yes  Yes on 5/3/2023 (Age - 16 m)    Can drink from a regular cup (not one with a spout) without spilling Yes  Yes on 5/3/2023 (Age - 16 m)      Developmental 24 Months Appropriate     Question Response Comments    Copies caretaker's actions, e.g. while doing housework Yes  Yes on 12/18/2023 (Age - 4y)    Can put one small (< 2\") block on top of another without it falling Yes  Yes on 12/18/2023 (Age - 4y)    Appropriately uses at least 3 " "words other than 'kathi' and 'mama' Yes  Yes on 12/18/2023 (Age - 4y)    Can take > 4 steps backwards without losing balance, e.g. when pulling a toy Yes  Yes on 12/18/2023 (Age - 4y)    Can take off clothes, including pants and pullover shirts Yes  Yes on 12/18/2023 (Age - 4y)    Can walk up steps by self without holding onto the next stair Yes  Yes on 12/18/2023 (Age - 4y)    Can point to at least 1 part of body when asked, without prompting Yes  Yes on 12/18/2023 (Age - 4y)    Feeds with utensil without spilling much Yes  Yes on 12/18/2023 (Age - 4y)    Helps to  toys or carry dishes when asked Yes  Yes on 12/18/2023 (Age - 4y)    Can kick a small ball (e.g. tennis ball) forward without support Yes  Yes on 12/18/2023 (Age - 4y)          Ages & Stages Questionnaire    Flowsheet Row Most Recent Value   AGES AND STAGES 30 MONTHS P                  Objective:      Growth parameters are noted and are appropriate for age.    Wt Readings from Last 1 Encounters:   06/17/24 11.4 kg (25 lb 3.2 oz) (12%, Z= -1.19)*     * Growth percentiles are based on CDC (Girls, 2-20 Years) data.     Ht Readings from Last 1 Encounters:   06/17/24 3' (0.914 m) (65%, Z= 0.39)*     * Growth percentiles are based on CDC (Girls, 2-20 Years) data.      Body mass index is 13.67 kg/m².    Vitals:    06/17/24 1608   Weight: 11.4 kg (25 lb 3.2 oz)   Height: 3' (0.914 m)   HC: 47.5 cm (18.7\")       Physical Exam  Vitals and nursing note reviewed.   Constitutional:       General: She is active.      Appearance: Normal appearance. She is well-developed.   HENT:      Head: Normocephalic.      Right Ear: Tympanic membrane, ear canal and external ear normal.      Left Ear: Tympanic membrane, ear canal and external ear normal.      Nose: Nose normal.      Mouth/Throat:      Mouth: Mucous membranes are moist.   Eyes:      General: Red reflex is present bilaterally.      Extraocular Movements: Extraocular movements intact.      Conjunctiva/sclera: " Conjunctivae normal.      Pupils: Pupils are equal, round, and reactive to light.   Cardiovascular:      Rate and Rhythm: Normal rate and regular rhythm.      Pulses: Normal pulses.      Heart sounds: Normal heart sounds.   Pulmonary:      Effort: Pulmonary effort is normal.      Breath sounds: Normal breath sounds.   Abdominal:      General: Abdomen is flat. Bowel sounds are normal.      Palpations: Abdomen is soft.   Genitourinary:     General: Normal vulva.      Rectum: Normal.   Musculoskeletal:         General: Normal range of motion.      Cervical back: Normal range of motion and neck supple.   Skin:     General: Skin is warm and dry.   Neurological:      General: No focal deficit present.      Mental Status: She is alert.       Review of Systems   Gastrointestinal:  Negative for constipation.   Psychiatric/Behavioral:  Negative for sleep disturbance.    All other systems reviewed and are negative.         Assessment:             1. Encounter for well child visit at 30 months of age  2. Encounter for screening for global developmental delays (milestones)  3. Allergy, initial encounter  -     EPINEPHrine (EPIPEN JR) 0.15 mg/0.3 mL SOAJ; Inject 0.3 mL (0.15 mg total) into a muscle once for 1 dose  -     Ambulatory Referral to Allergy; Future  4. Eczema, unspecified type  5. Multiple food allergies         Plan:          1. Anticipatory guidance: Gave handout on well-child issues at this age.    Developmental Screening:  Patient was screened for risk of developmental, behavorial, and social delays using the following standardized screening tool: Ages and Stages Questionnaire (ASQ).      2. Immunizations today: per orders  Vaccine Counseling: Discussed with: Ped parent/guardian: mother.    3. Follow-up visit in 6 months for next well child visit, or sooner as needed.    yes

## 2025-07-02 VITALS
WEIGHT: 154.98 LBS | TEMPERATURE: 98 F | SYSTOLIC BLOOD PRESSURE: 83 MMHG | RESPIRATION RATE: 18 BRPM | OXYGEN SATURATION: 98 % | DIASTOLIC BLOOD PRESSURE: 55 MMHG | HEART RATE: 113 BPM | HEIGHT: 68 IN

## 2025-07-02 LAB
ALBUMIN SERPL ELPH-MCNC: 3.2 G/DL — LOW (ref 3.4–5)
ALP SERPL-CCNC: 61 U/L — SIGNIFICANT CHANGE UP (ref 40–120)
ALT FLD-CCNC: 15 U/L — SIGNIFICANT CHANGE UP (ref 12–42)
ANION GAP SERPL CALC-SCNC: 6 MMOL/L — LOW (ref 9–16)
AST SERPL-CCNC: 17 U/L — SIGNIFICANT CHANGE UP (ref 15–37)
BILIRUB SERPL-MCNC: 0.9 MG/DL — SIGNIFICANT CHANGE UP (ref 0.2–1.2)
BUN SERPL-MCNC: 30 MG/DL — HIGH (ref 7–23)
CALCIUM SERPL-MCNC: 8.2 MG/DL — LOW (ref 8.5–10.5)
CHLORIDE SERPL-SCNC: 107 MMOL/L — SIGNIFICANT CHANGE UP (ref 96–108)
CO2 SERPL-SCNC: 28 MMOL/L — SIGNIFICANT CHANGE UP (ref 22–31)
CREAT SERPL-MCNC: 0.76 MG/DL — SIGNIFICANT CHANGE UP (ref 0.5–1.3)
EGFR: 114 ML/MIN/1.73M2 — SIGNIFICANT CHANGE UP
EGFR: 114 ML/MIN/1.73M2 — SIGNIFICANT CHANGE UP
GLUCOSE SERPL-MCNC: 128 MG/DL — HIGH (ref 70–99)
LIDOCAIN IGE QN: 27 U/L — SIGNIFICANT CHANGE UP (ref 16–77)
POTASSIUM SERPL-MCNC: 4 MMOL/L — SIGNIFICANT CHANGE UP (ref 3.5–5.3)
POTASSIUM SERPL-SCNC: 4 MMOL/L — SIGNIFICANT CHANGE UP (ref 3.5–5.3)
PROT SERPL-MCNC: 6.1 G/DL — LOW (ref 6.4–8.2)
SODIUM SERPL-SCNC: 141 MMOL/L — SIGNIFICANT CHANGE UP (ref 132–145)

## 2025-07-02 PROCEDURE — 99053 MED SERV 10PM-8AM 24 HR FAC: CPT

## 2025-07-02 PROCEDURE — 99285 EMERGENCY DEPT VISIT HI MDM: CPT

## 2025-07-02 RX ORDER — ONDANSETRON HCL/PF 4 MG/2 ML
4 VIAL (ML) INJECTION ONCE
Refills: 0 | Status: COMPLETED | OUTPATIENT
Start: 2025-07-02 | End: 2025-07-02

## 2025-07-03 ENCOUNTER — INPATIENT (INPATIENT)
Age: 45
LOS: 2 days | Discharge: LEFT BEFORE DISCHARGE | DRG: 432 | End: 2025-07-06
Attending: HOSPITALIST | Admitting: INTERNAL MEDICINE
Payer: COMMERCIAL

## 2025-07-03 LAB
ANION GAP SERPL CALC-SCNC: 10 MMOL/L — SIGNIFICANT CHANGE UP (ref 5–17)
APTT BLD: 30.1 SEC — SIGNIFICANT CHANGE UP (ref 26.1–36.8)
APTT BLD: 32 SEC — SIGNIFICANT CHANGE UP (ref 26.1–36.8)
BASOPHILS # BLD AUTO: 0.03 K/UL — SIGNIFICANT CHANGE UP (ref 0–0.2)
BASOPHILS # BLD AUTO: 0.03 K/UL — SIGNIFICANT CHANGE UP (ref 0–0.2)
BASOPHILS NFR BLD AUTO: 0.2 % — SIGNIFICANT CHANGE UP (ref 0–2)
BASOPHILS NFR BLD AUTO: 0.4 % — SIGNIFICANT CHANGE UP (ref 0–2)
BLD GP AB SCN SERPL QL: NEGATIVE — SIGNIFICANT CHANGE UP
BUN SERPL-MCNC: 20 MG/DL — SIGNIFICANT CHANGE UP (ref 7–23)
CALCIUM SERPL-MCNC: 8.3 MG/DL — LOW (ref 8.4–10.5)
CHLORIDE SERPL-SCNC: 110 MMOL/L — HIGH (ref 96–108)
CO2 SERPL-SCNC: 21 MMOL/L — LOW (ref 22–31)
CREAT SERPL-MCNC: 0.71 MG/DL — SIGNIFICANT CHANGE UP (ref 0.5–1.3)
EGFR: 117 ML/MIN/1.73M2 — SIGNIFICANT CHANGE UP
EGFR: 117 ML/MIN/1.73M2 — SIGNIFICANT CHANGE UP
EOSINOPHIL # BLD AUTO: 0.39 K/UL — SIGNIFICANT CHANGE UP (ref 0–0.5)
EOSINOPHIL # BLD AUTO: 0.54 K/UL — HIGH (ref 0–0.5)
EOSINOPHIL NFR BLD AUTO: 4.3 % — SIGNIFICANT CHANGE UP (ref 0–6)
EOSINOPHIL NFR BLD AUTO: 5.2 % — SIGNIFICANT CHANGE UP (ref 0–6)
ETHANOL SERPL-MCNC: <10 MG/DL — SIGNIFICANT CHANGE UP (ref 0–10)
FIBRINOGEN PPP-MCNC: 171 MG/DL — LOW (ref 200–445)
GLUCOSE SERPL-MCNC: 147 MG/DL — HIGH (ref 70–99)
HCT VFR BLD CALC: 28.8 % — LOW (ref 39–50)
HCT VFR BLD CALC: 29.4 % — LOW (ref 39–50)
HCT VFR BLD CALC: 30.7 % — LOW (ref 39–50)
HCT VFR BLD CALC: 31.7 % — LOW (ref 39–50)
HCT VFR BLD CALC: 34.2 % — LOW (ref 39–50)
HGB BLD-MCNC: 10.1 G/DL — LOW (ref 13–17)
HGB BLD-MCNC: 10.2 G/DL — LOW (ref 13–17)
HGB BLD-MCNC: 10.5 G/DL — LOW (ref 13–17)
HGB BLD-MCNC: 11.5 G/DL — LOW (ref 13–17)
HGB BLD-MCNC: 9.9 G/DL — LOW (ref 13–17)
IMM GRANULOCYTES # BLD AUTO: 0.03 K/UL — SIGNIFICANT CHANGE UP (ref 0–0.07)
IMM GRANULOCYTES # BLD AUTO: 0.07 K/UL — SIGNIFICANT CHANGE UP (ref 0–0.07)
IMM GRANULOCYTES NFR BLD AUTO: 0.4 % — SIGNIFICANT CHANGE UP (ref 0–0.9)
IMM GRANULOCYTES NFR BLD AUTO: 0.6 % — SIGNIFICANT CHANGE UP (ref 0–0.9)
IMMATURE PLATELET FRACTION #: 1.1 K/UL — LOW (ref 3.9–12.5)
IMMATURE PLATELET FRACTION #: 1.7 K/UL — LOW (ref 3.9–12.5)
IMMATURE PLATELET FRACTION #: 1.8 K/UL — LOW (ref 3.9–12.5)
IMMATURE PLATELET FRACTION #: 1.9 K/UL — LOW (ref 3.9–12.5)
IMMATURE PLATELET FRACTION #: 2.5 K/UL — LOW (ref 3.9–12.5)
IMMATURE PLATELET FRACTION %: 2.2 % — SIGNIFICANT CHANGE UP (ref 1.6–7.1)
IMMATURE PLATELET FRACTION %: 2.2 % — SIGNIFICANT CHANGE UP (ref 1.6–7.1)
IMMATURE PLATELET FRACTION %: 2.4 % — SIGNIFICANT CHANGE UP (ref 1.6–7.1)
IMMATURE PLATELET FRACTION %: 2.4 % — SIGNIFICANT CHANGE UP (ref 1.6–7.1)
IMMATURE PLATELET FRACTION %: 2.8 % — SIGNIFICANT CHANGE UP (ref 1.6–7.1)
INR BLD: 1.12 — SIGNIFICANT CHANGE UP (ref 0.85–1.16)
INR BLD: 1.36 — HIGH (ref 0.85–1.16)
LACTATE BLDV-MCNC: 1.3 MMOL/L — SIGNIFICANT CHANGE UP (ref 0.5–2)
LACTATE BLDV-MCNC: 2.4 MMOL/L — HIGH (ref 0.5–2)
LYMPHOCYTES # BLD AUTO: 1.3 K/UL — SIGNIFICANT CHANGE UP (ref 1–3.3)
LYMPHOCYTES # BLD AUTO: 1.73 K/UL — SIGNIFICANT CHANGE UP (ref 1–3.3)
LYMPHOCYTES NFR BLD AUTO: 10.4 % — LOW (ref 13–44)
LYMPHOCYTES NFR BLD AUTO: 23 % — SIGNIFICANT CHANGE UP (ref 13–44)
MAGNESIUM SERPL-MCNC: 1.6 MG/DL — SIGNIFICANT CHANGE UP (ref 1.6–2.6)
MCHC RBC-ENTMCNC: 33.1 G/DL — SIGNIFICANT CHANGE UP (ref 32–36)
MCHC RBC-ENTMCNC: 33.2 G/DL — SIGNIFICANT CHANGE UP (ref 32–36)
MCHC RBC-ENTMCNC: 33.6 G/DL — SIGNIFICANT CHANGE UP (ref 32–36)
MCHC RBC-ENTMCNC: 33.6 PG — SIGNIFICANT CHANGE UP (ref 27–34)
MCHC RBC-ENTMCNC: 33.7 PG — SIGNIFICANT CHANGE UP (ref 27–34)
MCHC RBC-ENTMCNC: 33.9 PG — SIGNIFICANT CHANGE UP (ref 27–34)
MCHC RBC-ENTMCNC: 34.4 G/DL — SIGNIFICANT CHANGE UP (ref 32–36)
MCHC RBC-ENTMCNC: 34.4 G/DL — SIGNIFICANT CHANGE UP (ref 32–36)
MCV RBC AUTO: 100.9 FL — HIGH (ref 80–100)
MCV RBC AUTO: 101 FL — HIGH (ref 80–100)
MCV RBC AUTO: 101.6 FL — HIGH (ref 80–100)
MCV RBC AUTO: 98.6 FL — SIGNIFICANT CHANGE UP (ref 80–100)
MCV RBC AUTO: 98.7 FL — SIGNIFICANT CHANGE UP (ref 80–100)
MONOCYTES # BLD AUTO: 0.52 K/UL — SIGNIFICANT CHANGE UP (ref 0–0.9)
MONOCYTES # BLD AUTO: 0.74 K/UL — SIGNIFICANT CHANGE UP (ref 0–0.9)
MONOCYTES NFR BLD AUTO: 4.2 % — SIGNIFICANT CHANGE UP (ref 2–14)
MONOCYTES NFR BLD AUTO: 9.8 % — SIGNIFICANT CHANGE UP (ref 2–14)
NEUTROPHILS # BLD AUTO: 10.05 K/UL — HIGH (ref 1.8–7.4)
NEUTROPHILS # BLD AUTO: 4.61 K/UL — SIGNIFICANT CHANGE UP (ref 1.8–7.4)
NEUTROPHILS NFR BLD AUTO: 61.2 % — SIGNIFICANT CHANGE UP (ref 43–77)
NEUTROPHILS NFR BLD AUTO: 80.3 % — HIGH (ref 43–77)
NRBC # BLD AUTO: 0 K/UL — SIGNIFICANT CHANGE UP (ref 0–0)
NRBC # FLD: 0 K/UL — SIGNIFICANT CHANGE UP (ref 0–0)
NRBC BLD AUTO-RTO: 0 /100 WBCS — SIGNIFICANT CHANGE UP (ref 0–0)
PCP SPEC-MCNC: SIGNIFICANT CHANGE UP
PHOSPHATE SERPL-MCNC: 2.5 MG/DL — SIGNIFICANT CHANGE UP (ref 2.5–4.5)
PLATELET # BLD AUTO: 50 K/UL — LOW (ref 150–400)
PLATELET # BLD AUTO: 71 K/UL — LOW (ref 150–400)
PLATELET # BLD AUTO: 77 K/UL — LOW (ref 150–400)
PLATELET # BLD AUTO: 86 K/UL — LOW (ref 150–400)
PLATELET # BLD AUTO: 90 K/UL — LOW (ref 150–400)
PMV BLD: 10.1 FL — SIGNIFICANT CHANGE UP (ref 7–13)
PMV BLD: 10.3 FL — SIGNIFICANT CHANGE UP (ref 7–13)
PMV BLD: 10.3 FL — SIGNIFICANT CHANGE UP (ref 7–13)
PMV BLD: 10.8 FL — SIGNIFICANT CHANGE UP (ref 7–13)
PMV BLD: 11.1 FL — SIGNIFICANT CHANGE UP (ref 7–13)
POTASSIUM SERPL-MCNC: 4.4 MMOL/L — SIGNIFICANT CHANGE UP (ref 3.5–5.3)
POTASSIUM SERPL-SCNC: 4.4 MMOL/L — SIGNIFICANT CHANGE UP (ref 3.5–5.3)
PROTHROM AB SERPL-ACNC: 12.9 SEC — SIGNIFICANT CHANGE UP (ref 9.9–13.4)
PROTHROM AB SERPL-ACNC: 15.7 SEC — HIGH (ref 9.9–13.4)
RBC # BLD: 2.92 M/UL — LOW (ref 4.2–5.8)
RBC # BLD: 2.98 M/UL — LOW (ref 4.2–5.8)
RBC # BLD: 3.04 M/UL — LOW (ref 4.2–5.8)
RBC # BLD: 3.12 M/UL — LOW (ref 4.2–5.8)
RBC # BLD: 3.39 M/UL — LOW (ref 4.2–5.8)
RBC # FLD: 13.9 % — SIGNIFICANT CHANGE UP (ref 10.3–14.5)
RBC # FLD: 14 % — SIGNIFICANT CHANGE UP (ref 10.3–14.5)
RBC # FLD: 15.6 % — HIGH (ref 10.3–14.5)
RBC # FLD: 15.9 % — HIGH (ref 10.3–14.5)
RBC # FLD: 15.9 % — HIGH (ref 10.3–14.5)
RH IG SCN BLD-IMP: POSITIVE — SIGNIFICANT CHANGE UP
SODIUM SERPL-SCNC: 141 MMOL/L — SIGNIFICANT CHANGE UP (ref 135–145)
WBC # BLD: 12.51 K/UL — HIGH (ref 3.8–10.5)
WBC # BLD: 4.21 K/UL — SIGNIFICANT CHANGE UP (ref 3.8–10.5)
WBC # BLD: 6.64 K/UL — SIGNIFICANT CHANGE UP (ref 3.8–10.5)
WBC # BLD: 7.53 K/UL — SIGNIFICANT CHANGE UP (ref 3.8–10.5)
WBC # BLD: 9.28 K/UL — SIGNIFICANT CHANGE UP (ref 3.8–10.5)
WBC # FLD AUTO: 12.51 K/UL — HIGH (ref 3.8–10.5)
WBC # FLD AUTO: 4.21 K/UL — SIGNIFICANT CHANGE UP (ref 3.8–10.5)
WBC # FLD AUTO: 6.64 K/UL — SIGNIFICANT CHANGE UP (ref 3.8–10.5)
WBC # FLD AUTO: 7.53 K/UL — SIGNIFICANT CHANGE UP (ref 3.8–10.5)
WBC # FLD AUTO: 9.28 K/UL — SIGNIFICANT CHANGE UP (ref 3.8–10.5)

## 2025-07-03 PROCEDURE — 85025 COMPLETE CBC W/AUTO DIFF WBC: CPT

## 2025-07-03 PROCEDURE — 80048 BASIC METABOLIC PNL TOTAL CA: CPT

## 2025-07-03 PROCEDURE — 86920 COMPATIBILITY TEST SPIN: CPT

## 2025-07-03 PROCEDURE — 85610 PROTHROMBIN TIME: CPT

## 2025-07-03 PROCEDURE — 83735 ASSAY OF MAGNESIUM: CPT

## 2025-07-03 PROCEDURE — 80307 DRUG TEST PRSMV CHEM ANLYZR: CPT

## 2025-07-03 PROCEDURE — 85730 THROMBOPLASTIN TIME PARTIAL: CPT

## 2025-07-03 PROCEDURE — 83690 ASSAY OF LIPASE: CPT

## 2025-07-03 PROCEDURE — 86850 RBC ANTIBODY SCREEN: CPT

## 2025-07-03 PROCEDURE — 80053 COMPREHEN METABOLIC PANEL: CPT

## 2025-07-03 PROCEDURE — 84100 ASSAY OF PHOSPHORUS: CPT

## 2025-07-03 PROCEDURE — 83605 ASSAY OF LACTIC ACID: CPT

## 2025-07-03 PROCEDURE — 99255 IP/OBS CONSLTJ NEW/EST HI 80: CPT | Mod: GC,25

## 2025-07-03 PROCEDURE — 36415 COLL VENOUS BLD VENIPUNCTURE: CPT

## 2025-07-03 PROCEDURE — 99291 CRITICAL CARE FIRST HOUR: CPT | Mod: GC

## 2025-07-03 PROCEDURE — 85384 FIBRINOGEN ACTIVITY: CPT

## 2025-07-03 PROCEDURE — 71045 X-RAY EXAM CHEST 1 VIEW: CPT | Mod: 26

## 2025-07-03 PROCEDURE — 86901 BLOOD TYPING SEROLOGIC RH(D): CPT

## 2025-07-03 PROCEDURE — 86900 BLOOD TYPING SEROLOGIC ABO: CPT

## 2025-07-03 PROCEDURE — 74177 CT ABD & PELVIS W/CONTRAST: CPT | Mod: 26

## 2025-07-03 PROCEDURE — 85027 COMPLETE CBC AUTOMATED: CPT

## 2025-07-03 PROCEDURE — 99223 1ST HOSP IP/OBS HIGH 75: CPT | Mod: GC,25

## 2025-07-03 PROCEDURE — 43244 EGD VARICES LIGATION: CPT | Mod: GC

## 2025-07-03 RX ORDER — OCTREOTIDE ACETATE 500 UG/ML
50 INJECTION, SOLUTION INTRAVENOUS; SUBCUTANEOUS
Qty: 500 | Refills: 0 | Status: DISCONTINUED | OUTPATIENT
Start: 2025-07-03 | End: 2025-07-05

## 2025-07-03 RX ORDER — MIDAZOLAM IN 0.9 % SOD.CHLORID 1 MG/ML
8 PLASTIC BAG, INJECTION (ML) INTRAVENOUS ONCE
Refills: 0 | Status: DISCONTINUED | OUTPATIENT
Start: 2025-07-03 | End: 2025-07-03

## 2025-07-03 RX ORDER — PROPOFOL 10 MG/ML
5 INJECTION, EMULSION INTRAVENOUS
Qty: 1000 | Refills: 0 | Status: DISCONTINUED | OUTPATIENT
Start: 2025-07-03 | End: 2025-07-03

## 2025-07-03 RX ORDER — OCTREOTIDE ACETATE 500 UG/ML
50 INJECTION, SOLUTION INTRAVENOUS; SUBCUTANEOUS ONCE
Refills: 0 | Status: COMPLETED | OUTPATIENT
Start: 2025-07-03 | End: 2025-07-03

## 2025-07-03 RX ORDER — CEFTRIAXONE 500 MG/1
1000 INJECTION, POWDER, FOR SOLUTION INTRAMUSCULAR; INTRAVENOUS EVERY 24 HOURS
Refills: 0 | Status: DISCONTINUED | OUTPATIENT
Start: 2025-07-03 | End: 2025-07-06

## 2025-07-03 RX ORDER — NOREPINEPHRINE BITARTRATE 8 MG
0.05 SOLUTION INTRAVENOUS
Qty: 8 | Refills: 0 | Status: DISCONTINUED | OUTPATIENT
Start: 2025-07-03 | End: 2025-07-03

## 2025-07-03 RX ORDER — FENTANYL CITRATE-0.9 % NACL/PF 100MCG/2ML
0.5 SYRINGE (ML) INTRAVENOUS
Qty: 2500 | Refills: 0 | Status: DISCONTINUED | OUTPATIENT
Start: 2025-07-03 | End: 2025-07-03

## 2025-07-03 RX ORDER — MIDAZOLAM IN 0.9 % SOD.CHLORID 1 MG/ML
6 PLASTIC BAG, INJECTION (ML) INTRAVENOUS ONCE
Refills: 0 | Status: DISCONTINUED | OUTPATIENT
Start: 2025-07-03 | End: 2025-07-03

## 2025-07-03 RX ORDER — LORAZEPAM 4 MG/ML
2 VIAL (ML) INJECTION
Refills: 0 | Status: DISCONTINUED | OUTPATIENT
Start: 2025-07-03 | End: 2025-07-03

## 2025-07-03 RX ORDER — KETAMINE HCL IN 0.9 % NACL 50 MG/5 ML
70 SYRINGE (ML) INTRAVENOUS ONCE
Refills: 0 | Status: DISCONTINUED | OUTPATIENT
Start: 2025-07-03 | End: 2025-07-03

## 2025-07-03 RX ORDER — ACETAMINOPHEN 500 MG/5ML
1000 LIQUID (ML) ORAL ONCE
Refills: 0 | Status: COMPLETED | OUTPATIENT
Start: 2025-07-03 | End: 2025-07-03

## 2025-07-03 RX ORDER — MIDAZOLAM IN 0.9 % SOD.CHLORID 1 MG/ML
4 PLASTIC BAG, INJECTION (ML) INTRAVENOUS ONCE
Refills: 0 | Status: DISCONTINUED | OUTPATIENT
Start: 2025-07-03 | End: 2025-07-03

## 2025-07-03 RX ORDER — ERYTHROMYCIN ETHYLSUCCINATE 200 MG/5ML
250 SUSPENSION, RECONSTITUTED, ORAL (ML) ORAL ONCE
Refills: 0 | Status: COMPLETED | OUTPATIENT
Start: 2025-07-03 | End: 2025-07-03

## 2025-07-03 RX ORDER — KETAMINE HCL IN 0.9 % NACL 50 MG/5 ML
100 SYRINGE (ML) INTRAVENOUS ONCE
Refills: 0 | Status: DISCONTINUED | OUTPATIENT
Start: 2025-07-03 | End: 2025-07-03

## 2025-07-03 RX ADMIN — PROPOFOL 2.11 MICROGRAM(S)/KG/MIN: 10 INJECTION, EMULSION INTRAVENOUS at 14:37

## 2025-07-03 RX ADMIN — Medication 1000 MILLIGRAM(S): at 19:15

## 2025-07-03 RX ADMIN — Medication 100 MILLIGRAM(S): at 12:42

## 2025-07-03 RX ADMIN — OCTREOTIDE ACETATE 10 MICROGRAM(S)/HR: 500 INJECTION, SOLUTION INTRAVENOUS; SUBCUTANEOUS at 13:06

## 2025-07-03 RX ADMIN — CEFTRIAXONE 100 MILLIGRAM(S): 500 INJECTION, POWDER, FOR SOLUTION INTRAMUSCULAR; INTRAVENOUS at 09:42

## 2025-07-03 RX ADMIN — Medication 3.52 MICROGRAM(S)/KG/HR: at 13:05

## 2025-07-03 RX ADMIN — Medication 400 MILLIGRAM(S): at 18:49

## 2025-07-03 RX ADMIN — Medication 10 MG/HR: at 10:35

## 2025-07-03 RX ADMIN — Medication 250 MILLIGRAM(S): at 10:35

## 2025-07-03 RX ADMIN — NOREPINEPHRINE BITARTRATE 6.59 MICROGRAM(S)/KG/MIN: 8 SOLUTION at 12:26

## 2025-07-03 RX ADMIN — PROPOFOL 2.11 MICROGRAM(S)/KG/MIN: 10 INJECTION, EMULSION INTRAVENOUS at 12:26

## 2025-07-03 RX ADMIN — Medication 40 MILLIGRAM(S): at 23:24

## 2025-07-03 RX ADMIN — Medication 6 MILLIGRAM(S): at 12:42

## 2025-07-04 LAB
A1AT SERPL-MCNC: 165 MG/DL — SIGNIFICANT CHANGE UP (ref 90–200)
APTT BLD: 28.2 SEC — SIGNIFICANT CHANGE UP (ref 26.1–36.8)
BASOPHILS # BLD AUTO: 0 K/UL — SIGNIFICANT CHANGE UP (ref 0–0.2)
BASOPHILS # BLD AUTO: 0.01 K/UL — SIGNIFICANT CHANGE UP (ref 0–0.2)
BASOPHILS NFR BLD AUTO: 0 % — SIGNIFICANT CHANGE UP (ref 0–2)
BASOPHILS NFR BLD AUTO: 0.2 % — SIGNIFICANT CHANGE UP (ref 0–2)
BASOPHILS NFR BLD AUTO: 0.2 % — SIGNIFICANT CHANGE UP (ref 0–2)
BASOPHILS NFR BLD AUTO: 0.3 % — SIGNIFICANT CHANGE UP (ref 0–2)
BILIRUB DIRECT SERPL-MCNC: 0.4 MG/DL — HIGH (ref 0–0.3)
BILIRUB INDIRECT FLD-MCNC: 1.1 MG/DL — HIGH (ref 0.2–1)
BILIRUB SERPL-MCNC: 1.5 MG/DL — HIGH (ref 0.2–1.2)
CERULOPLASMIN SERPL-MCNC: 13 MG/DL — LOW (ref 15–30)
EOSINOPHIL # BLD AUTO: 0.04 K/UL — SIGNIFICANT CHANGE UP (ref 0–0.5)
EOSINOPHIL # BLD AUTO: 0.05 K/UL — SIGNIFICANT CHANGE UP (ref 0–0.5)
EOSINOPHIL # BLD AUTO: 0.18 K/UL — SIGNIFICANT CHANGE UP (ref 0–0.5)
EOSINOPHIL # BLD AUTO: 0.37 K/UL — SIGNIFICANT CHANGE UP (ref 0–0.5)
EOSINOPHIL NFR BLD AUTO: 1.1 % — SIGNIFICANT CHANGE UP (ref 0–6)
EOSINOPHIL NFR BLD AUTO: 1.4 % — SIGNIFICANT CHANGE UP (ref 0–6)
EOSINOPHIL NFR BLD AUTO: 4 % — SIGNIFICANT CHANGE UP (ref 0–6)
EOSINOPHIL NFR BLD AUTO: 5.7 % — SIGNIFICANT CHANGE UP (ref 0–6)
FERRITIN SERPL-MCNC: 550 NG/ML — HIGH (ref 30–400)
FIBRINOGEN PPP-MCNC: 220 MG/DL — SIGNIFICANT CHANGE UP (ref 200–445)
HBV CORE AB SER-ACNC: SIGNIFICANT CHANGE UP
HBV SURFACE AB SER-ACNC: REACTIVE — SIGNIFICANT CHANGE UP
HBV SURFACE AG SER-ACNC: SIGNIFICANT CHANGE UP
HCT VFR BLD CALC: 26.5 % — LOW (ref 39–50)
HCT VFR BLD CALC: 27 % — LOW (ref 39–50)
HCT VFR BLD CALC: 27.3 % — LOW (ref 39–50)
HCT VFR BLD CALC: 28 % — LOW (ref 39–50)
HCT VFR BLD CALC: 28.3 % — LOW (ref 39–50)
HCV AB S/CO SERPL IA: 0.05 S/CO — SIGNIFICANT CHANGE UP
HCV AB SERPL-IMP: SIGNIFICANT CHANGE UP
HGB BLD-MCNC: 8.8 G/DL — LOW (ref 13–17)
HGB BLD-MCNC: 9 G/DL — LOW (ref 13–17)
HGB BLD-MCNC: 9.1 G/DL — LOW (ref 13–17)
HGB BLD-MCNC: 9.2 G/DL — LOW (ref 13–17)
HGB BLD-MCNC: 9.5 G/DL — LOW (ref 13–17)
IGA FLD-MCNC: 210 MG/DL — SIGNIFICANT CHANGE UP (ref 84–499)
IGG FLD-MCNC: 694 MG/DL — SIGNIFICANT CHANGE UP (ref 610–1660)
IGM SERPL-MCNC: 32 MG/DL — LOW (ref 35–242)
IMM GRANULOCYTES # BLD AUTO: 0.01 K/UL — SIGNIFICANT CHANGE UP (ref 0–0.07)
IMM GRANULOCYTES # BLD AUTO: 0.01 K/UL — SIGNIFICANT CHANGE UP (ref 0–0.07)
IMM GRANULOCYTES # BLD AUTO: 0.02 K/UL — SIGNIFICANT CHANGE UP (ref 0–0.07)
IMM GRANULOCYTES # BLD AUTO: 0.03 K/UL — SIGNIFICANT CHANGE UP (ref 0–0.07)
IMM GRANULOCYTES NFR BLD AUTO: 0.2 % — SIGNIFICANT CHANGE UP (ref 0–0.9)
IMM GRANULOCYTES NFR BLD AUTO: 0.3 % — SIGNIFICANT CHANGE UP (ref 0–0.9)
IMM GRANULOCYTES NFR BLD AUTO: 0.5 % — SIGNIFICANT CHANGE UP (ref 0–0.9)
IMM GRANULOCYTES NFR BLD AUTO: 0.6 % — SIGNIFICANT CHANGE UP (ref 0–0.9)
IMMATURE PLATELET FRACTION #: 1 K/UL — LOW (ref 3.9–12.5)
IMMATURE PLATELET FRACTION #: 1 K/UL — LOW (ref 3.9–12.5)
IMMATURE PLATELET FRACTION #: 1.1 K/UL — LOW (ref 3.9–12.5)
IMMATURE PLATELET FRACTION #: 1.4 K/UL — LOW (ref 3.9–12.5)
IMMATURE PLATELET FRACTION #: 1.8 K/UL — LOW (ref 3.9–12.5)
IMMATURE PLATELET FRACTION %: 2.7 % — SIGNIFICANT CHANGE UP (ref 1.6–7.1)
IMMATURE PLATELET FRACTION %: 2.9 % — SIGNIFICANT CHANGE UP (ref 1.6–7.1)
IMMATURE PLATELET FRACTION %: 3 % — SIGNIFICANT CHANGE UP (ref 1.6–7.1)
IMMATURE PLATELET FRACTION %: 3.3 % — SIGNIFICANT CHANGE UP (ref 1.6–7.1)
IMMATURE PLATELET FRACTION %: 3.4 % — SIGNIFICANT CHANGE UP (ref 1.6–7.1)
INR BLD: 1.17 — HIGH (ref 0.85–1.16)
IRON SATN MFR SERPL: 19 % — SIGNIFICANT CHANGE UP (ref 16–55)
IRON SATN MFR SERPL: 39 UG/DL — LOW (ref 45–165)
KAPPA LC SER QL IFE: 2.06 MG/DL — HIGH (ref 0.33–1.94)
KAPPA/LAMBDA FREE LIGHT CHAIN RATIO, SERUM: 1.06 RATIO — SIGNIFICANT CHANGE UP (ref 0.26–1.65)
LAMBDA LC SER QL IFE: 1.94 MG/DL — SIGNIFICANT CHANGE UP (ref 0.57–2.63)
LYMPHOCYTES # BLD AUTO: 0.41 K/UL — LOW (ref 1–3.3)
LYMPHOCYTES # BLD AUTO: 0.46 K/UL — LOW (ref 1–3.3)
LYMPHOCYTES # BLD AUTO: 0.65 K/UL — LOW (ref 1–3.3)
LYMPHOCYTES # BLD AUTO: 1.19 K/UL — SIGNIFICANT CHANGE UP (ref 1–3.3)
LYMPHOCYTES NFR BLD AUTO: 11.7 % — LOW (ref 13–44)
LYMPHOCYTES NFR BLD AUTO: 12.7 % — LOW (ref 13–44)
LYMPHOCYTES NFR BLD AUTO: 14.3 % — SIGNIFICANT CHANGE UP (ref 13–44)
LYMPHOCYTES NFR BLD AUTO: 18.4 % — SIGNIFICANT CHANGE UP (ref 13–44)
MCHC RBC-ENTMCNC: 32.9 G/DL — SIGNIFICANT CHANGE UP (ref 32–36)
MCHC RBC-ENTMCNC: 33.2 G/DL — SIGNIFICANT CHANGE UP (ref 32–36)
MCHC RBC-ENTMCNC: 33.3 G/DL — SIGNIFICANT CHANGE UP (ref 32–36)
MCHC RBC-ENTMCNC: 33.3 G/DL — SIGNIFICANT CHANGE UP (ref 32–36)
MCHC RBC-ENTMCNC: 33.3 PG — SIGNIFICANT CHANGE UP (ref 27–34)
MCHC RBC-ENTMCNC: 33.6 G/DL — SIGNIFICANT CHANGE UP (ref 32–36)
MCHC RBC-ENTMCNC: 33.7 PG — SIGNIFICANT CHANGE UP (ref 27–34)
MCHC RBC-ENTMCNC: 33.7 PG — SIGNIFICANT CHANGE UP (ref 27–34)
MCHC RBC-ENTMCNC: 33.8 PG — SIGNIFICANT CHANGE UP (ref 27–34)
MCHC RBC-ENTMCNC: 34.4 PG — HIGH (ref 27–34)
MCV RBC AUTO: 101.1 FL — HIGH (ref 80–100)
MCV RBC AUTO: 101.4 FL — HIGH (ref 80–100)
MCV RBC AUTO: 101.5 FL — HIGH (ref 80–100)
MCV RBC AUTO: 101.5 FL — HIGH (ref 80–100)
MCV RBC AUTO: 102.5 FL — HIGH (ref 80–100)
MONOCYTES # BLD AUTO: 0.17 K/UL — SIGNIFICANT CHANGE UP (ref 0–0.9)
MONOCYTES # BLD AUTO: 0.23 K/UL — SIGNIFICANT CHANGE UP (ref 0–0.9)
MONOCYTES # BLD AUTO: 0.29 K/UL — SIGNIFICANT CHANGE UP (ref 0–0.9)
MONOCYTES # BLD AUTO: 0.42 K/UL — SIGNIFICANT CHANGE UP (ref 0–0.9)
MONOCYTES NFR BLD AUTO: 4.9 % — SIGNIFICANT CHANGE UP (ref 2–14)
MONOCYTES NFR BLD AUTO: 6.4 % — SIGNIFICANT CHANGE UP (ref 2–14)
MONOCYTES NFR BLD AUTO: 6.4 % — SIGNIFICANT CHANGE UP (ref 2–14)
MONOCYTES NFR BLD AUTO: 6.5 % — SIGNIFICANT CHANGE UP (ref 2–14)
NEUTROPHILS # BLD AUTO: 2.85 K/UL — SIGNIFICANT CHANGE UP (ref 1.8–7.4)
NEUTROPHILS # BLD AUTO: 2.85 K/UL — SIGNIFICANT CHANGE UP (ref 1.8–7.4)
NEUTROPHILS # BLD AUTO: 3.4 K/UL — SIGNIFICANT CHANGE UP (ref 1.8–7.4)
NEUTROPHILS # BLD AUTO: 4.44 K/UL — SIGNIFICANT CHANGE UP (ref 1.8–7.4)
NEUTROPHILS NFR BLD AUTO: 68.7 % — SIGNIFICANT CHANGE UP (ref 43–77)
NEUTROPHILS NFR BLD AUTO: 74.9 % — SIGNIFICANT CHANGE UP (ref 43–77)
NEUTROPHILS NFR BLD AUTO: 78.9 % — HIGH (ref 43–77)
NEUTROPHILS NFR BLD AUTO: 81.7 % — HIGH (ref 43–77)
NRBC # BLD AUTO: 0 K/UL — SIGNIFICANT CHANGE UP (ref 0–0)
NRBC # FLD: 0 K/UL — SIGNIFICANT CHANGE UP (ref 0–0)
NRBC BLD AUTO-RTO: 0 /100 WBCS — SIGNIFICANT CHANGE UP (ref 0–0)
PLATELET # BLD AUTO: 34 K/UL — LOW (ref 150–400)
PLATELET # BLD AUTO: 38 K/UL — LOW (ref 150–400)
PLATELET # BLD AUTO: 38 K/UL — LOW (ref 150–400)
PLATELET # BLD AUTO: 43 K/UL — LOW (ref 150–400)
PLATELET # BLD AUTO: 54 K/UL — LOW (ref 150–400)
PMV BLD: 10.5 FL — SIGNIFICANT CHANGE UP (ref 7–13)
PMV BLD: 10.7 FL — SIGNIFICANT CHANGE UP (ref 7–13)
PMV BLD: 10.7 FL — SIGNIFICANT CHANGE UP (ref 7–13)
PMV BLD: 11.2 FL — SIGNIFICANT CHANGE UP (ref 7–13)
PMV BLD: 11.3 FL — SIGNIFICANT CHANGE UP (ref 7–13)
PROTHROM AB SERPL-ACNC: 13.4 SEC — SIGNIFICANT CHANGE UP (ref 9.9–13.4)
RBC # BLD: 2.61 M/UL — LOW (ref 4.2–5.8)
RBC # BLD: 2.66 M/UL — LOW (ref 4.2–5.8)
RBC # BLD: 2.7 M/UL — LOW (ref 4.2–5.8)
RBC # BLD: 2.76 M/UL — LOW (ref 4.2–5.8)
RBC # BLD: 2.76 M/UL — LOW (ref 4.2–5.8)
RBC # FLD: 15.6 % — HIGH (ref 10.3–14.5)
RBC # FLD: 15.7 % — HIGH (ref 10.3–14.5)
RBC # FLD: 15.7 % — HIGH (ref 10.3–14.5)
RBC # FLD: 15.8 % — HIGH (ref 10.3–14.5)
RBC # FLD: 15.8 % — HIGH (ref 10.3–14.5)
TIBC SERPL-MCNC: 210 UG/DL — LOW (ref 220–430)
UIBC SERPL-MCNC: 171 UG/DL — SIGNIFICANT CHANGE UP (ref 110–370)
WBC # BLD: 3.49 K/UL — LOW (ref 3.8–10.5)
WBC # BLD: 3.61 K/UL — LOW (ref 3.8–10.5)
WBC # BLD: 4.4 K/UL — SIGNIFICANT CHANGE UP (ref 3.8–10.5)
WBC # BLD: 4.54 K/UL — SIGNIFICANT CHANGE UP (ref 3.8–10.5)
WBC # BLD: 6.46 K/UL — SIGNIFICANT CHANGE UP (ref 3.8–10.5)
WBC # FLD AUTO: 3.49 K/UL — LOW (ref 3.8–10.5)
WBC # FLD AUTO: 3.61 K/UL — LOW (ref 3.8–10.5)
WBC # FLD AUTO: 4.4 K/UL — SIGNIFICANT CHANGE UP (ref 3.8–10.5)
WBC # FLD AUTO: 4.54 K/UL — SIGNIFICANT CHANGE UP (ref 3.8–10.5)
WBC # FLD AUTO: 6.46 K/UL — SIGNIFICANT CHANGE UP (ref 3.8–10.5)

## 2025-07-04 PROCEDURE — 99232 SBSQ HOSP IP/OBS MODERATE 35: CPT | Mod: GC

## 2025-07-04 PROCEDURE — 99233 SBSQ HOSP IP/OBS HIGH 50: CPT

## 2025-07-04 RX ORDER — METOCLOPRAMIDE HCL 10 MG
10 TABLET ORAL EVERY 6 HOURS
Refills: 0 | Status: DISCONTINUED | OUTPATIENT
Start: 2025-07-04 | End: 2025-07-06

## 2025-07-04 RX ORDER — ONDANSETRON HCL/PF 4 MG/2 ML
4 VIAL (ML) INJECTION EVERY 4 HOURS
Refills: 0 | Status: DISCONTINUED | OUTPATIENT
Start: 2025-07-04 | End: 2025-07-06

## 2025-07-04 RX ORDER — ONDANSETRON HCL/PF 4 MG/2 ML
4 VIAL (ML) INJECTION ONCE
Refills: 0 | Status: COMPLETED | OUTPATIENT
Start: 2025-07-04 | End: 2025-07-04

## 2025-07-04 RX ORDER — SODIUM CHLORIDE 9 G/1000ML
1000 INJECTION, SOLUTION INTRAVENOUS ONCE
Refills: 0 | Status: COMPLETED | OUTPATIENT
Start: 2025-07-04 | End: 2025-07-04

## 2025-07-04 RX ADMIN — Medication 4 MILLIGRAM(S): at 08:59

## 2025-07-04 RX ADMIN — OCTREOTIDE ACETATE 10 MICROGRAM(S)/HR: 500 INJECTION, SOLUTION INTRAVENOUS; SUBCUTANEOUS at 17:31

## 2025-07-04 RX ADMIN — Medication 40 MILLIGRAM(S): at 06:18

## 2025-07-04 RX ADMIN — CEFTRIAXONE 100 MILLIGRAM(S): 500 INJECTION, POWDER, FOR SOLUTION INTRAMUSCULAR; INTRAVENOUS at 08:59

## 2025-07-04 RX ADMIN — Medication 1 APPLICATION(S): at 05:11

## 2025-07-04 RX ADMIN — Medication 10 MILLIGRAM(S): at 11:24

## 2025-07-04 RX ADMIN — OCTREOTIDE ACETATE 10 MICROGRAM(S)/HR: 500 INJECTION, SOLUTION INTRAVENOUS; SUBCUTANEOUS at 07:30

## 2025-07-04 RX ADMIN — SODIUM CHLORIDE 1000 MILLILITER(S): 9 INJECTION, SOLUTION INTRAVENOUS at 16:27

## 2025-07-04 RX ADMIN — Medication 10 MILLIGRAM(S): at 18:03

## 2025-07-04 RX ADMIN — Medication 4 MILLIGRAM(S): at 00:47

## 2025-07-04 RX ADMIN — Medication 40 MILLIGRAM(S): at 17:31

## 2025-07-05 DIAGNOSIS — K74.60 UNSPECIFIED CIRRHOSIS OF LIVER: ICD-10-CM

## 2025-07-05 DIAGNOSIS — Z29.9 ENCOUNTER FOR PROPHYLACTIC MEASURES, UNSPECIFIED: ICD-10-CM

## 2025-07-05 DIAGNOSIS — K92.2 GASTROINTESTINAL HEMORRHAGE, UNSPECIFIED: ICD-10-CM

## 2025-07-05 DIAGNOSIS — F10.10 ALCOHOL ABUSE, UNCOMPLICATED: ICD-10-CM

## 2025-07-05 DIAGNOSIS — I85.00 ESOPHAGEAL VARICES WITHOUT BLEEDING: ICD-10-CM

## 2025-07-05 DIAGNOSIS — D69.6 THROMBOCYTOPENIA, UNSPECIFIED: ICD-10-CM

## 2025-07-05 LAB
ALBUMIN SERPL ELPH-MCNC: 3.2 G/DL — LOW (ref 3.3–5)
ALP SERPL-CCNC: 31 U/L — LOW (ref 40–120)
ALT FLD-CCNC: 8 U/L — LOW (ref 10–45)
ANION GAP SERPL CALC-SCNC: 7 MMOL/L — SIGNIFICANT CHANGE UP (ref 5–17)
AST SERPL-CCNC: 13 U/L — SIGNIFICANT CHANGE UP (ref 10–40)
BASOPHILS # BLD AUTO: 0.01 K/UL — SIGNIFICANT CHANGE UP (ref 0–0.2)
BASOPHILS NFR BLD AUTO: 0.3 % — SIGNIFICANT CHANGE UP (ref 0–2)
BILIRUB SERPL-MCNC: 0.8 MG/DL — SIGNIFICANT CHANGE UP (ref 0.2–1.2)
BUN SERPL-MCNC: 10 MG/DL — SIGNIFICANT CHANGE UP (ref 7–23)
CALCIUM SERPL-MCNC: 8.1 MG/DL — LOW (ref 8.4–10.5)
CHLORIDE SERPL-SCNC: 107 MMOL/L — SIGNIFICANT CHANGE UP (ref 96–108)
CO2 SERPL-SCNC: 26 MMOL/L — SIGNIFICANT CHANGE UP (ref 22–31)
CREAT SERPL-MCNC: 0.91 MG/DL — SIGNIFICANT CHANGE UP (ref 0.5–1.3)
EGFR: 107 ML/MIN/1.73M2 — SIGNIFICANT CHANGE UP
EGFR: 107 ML/MIN/1.73M2 — SIGNIFICANT CHANGE UP
EOSINOPHIL # BLD AUTO: 0.25 K/UL — SIGNIFICANT CHANGE UP (ref 0–0.5)
EOSINOPHIL NFR BLD AUTO: 7 % — HIGH (ref 0–6)
GLUCOSE SERPL-MCNC: 125 MG/DL — HIGH (ref 70–99)
HAV IGG SER QL IA: REACTIVE
HCT VFR BLD CALC: 25.3 % — LOW (ref 39–50)
HGB BLD-MCNC: 8.4 G/DL — LOW (ref 13–17)
IMM GRANULOCYTES # BLD AUTO: 0.01 K/UL — SIGNIFICANT CHANGE UP (ref 0–0.07)
IMM GRANULOCYTES NFR BLD AUTO: 0.3 % — SIGNIFICANT CHANGE UP (ref 0–0.9)
IMMATURE PLATELET FRACTION #: 1.1 K/UL — LOW (ref 3.9–12.5)
IMMATURE PLATELET FRACTION %: 2.7 % — SIGNIFICANT CHANGE UP (ref 1.6–7.1)
LYMPHOCYTES # BLD AUTO: 0.76 K/UL — LOW (ref 1–3.3)
LYMPHOCYTES NFR BLD AUTO: 21.4 % — SIGNIFICANT CHANGE UP (ref 13–44)
MAGNESIUM SERPL-MCNC: 1.8 MG/DL — SIGNIFICANT CHANGE UP (ref 1.6–2.6)
MCHC RBC-ENTMCNC: 33.2 G/DL — SIGNIFICANT CHANGE UP (ref 32–36)
MCHC RBC-ENTMCNC: 34 PG — SIGNIFICANT CHANGE UP (ref 27–34)
MCV RBC AUTO: 102.4 FL — HIGH (ref 80–100)
MONOCYTES # BLD AUTO: 0.25 K/UL — SIGNIFICANT CHANGE UP (ref 0–0.9)
MONOCYTES NFR BLD AUTO: 7 % — SIGNIFICANT CHANGE UP (ref 2–14)
NEUTROPHILS # BLD AUTO: 2.27 K/UL — SIGNIFICANT CHANGE UP (ref 1.8–7.4)
NEUTROPHILS NFR BLD AUTO: 64 % — SIGNIFICANT CHANGE UP (ref 43–77)
NRBC # BLD AUTO: 0 K/UL — SIGNIFICANT CHANGE UP (ref 0–0)
NRBC # FLD: 0 K/UL — SIGNIFICANT CHANGE UP (ref 0–0)
NRBC BLD AUTO-RTO: 0 /100 WBCS — SIGNIFICANT CHANGE UP (ref 0–0)
PHOSPHATE SERPL-MCNC: 2.3 MG/DL — LOW (ref 2.5–4.5)
PLATELET # BLD AUTO: 41 K/UL — LOW (ref 150–400)
PMV BLD: 11.2 FL — SIGNIFICANT CHANGE UP (ref 7–13)
POTASSIUM SERPL-MCNC: 3.8 MMOL/L — SIGNIFICANT CHANGE UP (ref 3.5–5.3)
POTASSIUM SERPL-SCNC: 3.8 MMOL/L — SIGNIFICANT CHANGE UP (ref 3.5–5.3)
PROT SERPL-MCNC: 4.7 G/DL — LOW (ref 6–8.3)
RBC # BLD: 2.47 M/UL — LOW (ref 4.2–5.8)
RBC # FLD: 15.3 % — HIGH (ref 10.3–14.5)
SODIUM SERPL-SCNC: 140 MMOL/L — SIGNIFICANT CHANGE UP (ref 135–145)
WBC # BLD: 3.55 K/UL — LOW (ref 3.8–10.5)
WBC # FLD AUTO: 3.55 K/UL — LOW (ref 3.8–10.5)

## 2025-07-05 PROCEDURE — 83735 ASSAY OF MAGNESIUM: CPT

## 2025-07-05 PROCEDURE — 83605 ASSAY OF LACTIC ACID: CPT

## 2025-07-05 PROCEDURE — 85730 THROMBOPLASTIN TIME PARTIAL: CPT

## 2025-07-05 PROCEDURE — 99233 SBSQ HOSP IP/OBS HIGH 50: CPT

## 2025-07-05 PROCEDURE — 80307 DRUG TEST PRSMV CHEM ANLYZR: CPT

## 2025-07-05 PROCEDURE — 85027 COMPLETE CBC AUTOMATED: CPT

## 2025-07-05 PROCEDURE — 87040 BLOOD CULTURE FOR BACTERIA: CPT

## 2025-07-05 PROCEDURE — 83521 IG LIGHT CHAINS FREE EACH: CPT

## 2025-07-05 PROCEDURE — 80321 ALCOHOLS BIOMARKERS 1OR 2: CPT

## 2025-07-05 PROCEDURE — 71045 X-RAY EXAM CHEST 1 VIEW: CPT

## 2025-07-05 PROCEDURE — 36415 COLL VENOUS BLD VENIPUNCTURE: CPT

## 2025-07-05 PROCEDURE — 80053 COMPREHEN METABOLIC PANEL: CPT

## 2025-07-05 PROCEDURE — 83550 IRON BINDING TEST: CPT

## 2025-07-05 PROCEDURE — 86708 HEPATITIS A ANTIBODY: CPT

## 2025-07-05 PROCEDURE — 82103 ALPHA-1-ANTITRYPSIN TOTAL: CPT

## 2025-07-05 PROCEDURE — 82784 ASSAY IGA/IGD/IGG/IGM EACH: CPT

## 2025-07-05 PROCEDURE — 86255 FLUORESCENT ANTIBODY SCREEN: CPT

## 2025-07-05 PROCEDURE — 82247 BILIRUBIN TOTAL: CPT

## 2025-07-05 PROCEDURE — 86376 MICROSOMAL ANTIBODY EACH: CPT

## 2025-07-05 PROCEDURE — 83540 ASSAY OF IRON: CPT

## 2025-07-05 PROCEDURE — 85610 PROTHROMBIN TIME: CPT

## 2025-07-05 PROCEDURE — 82390 ASSAY OF CERULOPLASMIN: CPT

## 2025-07-05 PROCEDURE — 85384 FIBRINOGEN ACTIVITY: CPT

## 2025-07-05 PROCEDURE — 86704 HEP B CORE ANTIBODY TOTAL: CPT

## 2025-07-05 PROCEDURE — 83520 IMMUNOASSAY QUANT NOS NONAB: CPT

## 2025-07-05 PROCEDURE — 82248 BILIRUBIN DIRECT: CPT

## 2025-07-05 PROCEDURE — 99233 SBSQ HOSP IP/OBS HIGH 50: CPT | Mod: GC

## 2025-07-05 PROCEDURE — 86381 MITOCHONDRIAL ANTIBODY EACH: CPT

## 2025-07-05 PROCEDURE — 80048 BASIC METABOLIC PNL TOTAL CA: CPT

## 2025-07-05 PROCEDURE — 86803 HEPATITIS C AB TEST: CPT

## 2025-07-05 PROCEDURE — 83690 ASSAY OF LIPASE: CPT

## 2025-07-05 PROCEDURE — 99232 SBSQ HOSP IP/OBS MODERATE 35: CPT | Mod: GC

## 2025-07-05 PROCEDURE — 87340 HEPATITIS B SURFACE AG IA: CPT

## 2025-07-05 PROCEDURE — 82728 ASSAY OF FERRITIN: CPT

## 2025-07-05 PROCEDURE — 86038 ANTINUCLEAR ANTIBODIES: CPT

## 2025-07-05 PROCEDURE — 85025 COMPLETE CBC W/AUTO DIFF WBC: CPT

## 2025-07-05 PROCEDURE — 86706 HEP B SURFACE ANTIBODY: CPT

## 2025-07-05 PROCEDURE — 74177 CT ABD & PELVIS W/CONTRAST: CPT

## 2025-07-05 PROCEDURE — 86901 BLOOD TYPING SEROLOGIC RH(D): CPT

## 2025-07-05 PROCEDURE — 86850 RBC ANTIBODY SCREEN: CPT

## 2025-07-05 PROCEDURE — 84100 ASSAY OF PHOSPHORUS: CPT

## 2025-07-05 PROCEDURE — 86920 COMPATIBILITY TEST SPIN: CPT

## 2025-07-05 PROCEDURE — 86900 BLOOD TYPING SEROLOGIC ABO: CPT

## 2025-07-05 RX ORDER — B1/B2/B3/B5/B6/B12/VIT C/FOLIC 500-0.5 MG
1 TABLET ORAL DAILY
Refills: 0 | Status: DISCONTINUED | OUTPATIENT
Start: 2025-07-05 | End: 2025-07-06

## 2025-07-05 RX ORDER — SOD PHOS DI, MONO/K PHOS MONO 250 MG
1 TABLET ORAL ONCE
Refills: 0 | Status: COMPLETED | OUTPATIENT
Start: 2025-07-05 | End: 2025-07-05

## 2025-07-05 RX ORDER — OCTREOTIDE ACETATE 500 UG/ML
100 INJECTION, SOLUTION INTRAVENOUS; SUBCUTANEOUS EVERY 8 HOURS
Refills: 0 | Status: DISCONTINUED | OUTPATIENT
Start: 2025-07-05 | End: 2025-07-05

## 2025-07-05 RX ORDER — MAGNESIUM SULFATE 500 MG/ML
2 SYRINGE (ML) INJECTION ONCE
Refills: 0 | Status: COMPLETED | OUTPATIENT
Start: 2025-07-05 | End: 2025-07-05

## 2025-07-05 RX ORDER — FOLIC ACID 1 MG/1
1 TABLET ORAL DAILY
Refills: 0 | Status: DISCONTINUED | OUTPATIENT
Start: 2025-07-05 | End: 2025-07-06

## 2025-07-05 RX ORDER — OCTREOTIDE ACETATE 500 UG/ML
50 INJECTION, SOLUTION INTRAVENOUS; SUBCUTANEOUS
Qty: 500 | Refills: 0 | Status: DISCONTINUED | OUTPATIENT
Start: 2025-07-05 | End: 2025-07-06

## 2025-07-05 RX ADMIN — Medication 105 MILLIGRAM(S): at 14:26

## 2025-07-05 RX ADMIN — OCTREOTIDE ACETATE 10 MICROGRAM(S)/HR: 500 INJECTION, SOLUTION INTRAVENOUS; SUBCUTANEOUS at 17:39

## 2025-07-05 RX ADMIN — Medication 20 MILLIEQUIVALENT(S): at 08:39

## 2025-07-05 RX ADMIN — Medication 40 MILLIGRAM(S): at 17:00

## 2025-07-05 RX ADMIN — CEFTRIAXONE 100 MILLIGRAM(S): 500 INJECTION, POWDER, FOR SOLUTION INTRAMUSCULAR; INTRAVENOUS at 08:36

## 2025-07-05 RX ADMIN — FOLIC ACID 1 MILLIGRAM(S): 1 TABLET ORAL at 11:36

## 2025-07-05 RX ADMIN — Medication 25 GRAM(S): at 09:22

## 2025-07-05 RX ADMIN — Medication 40 MILLIGRAM(S): at 06:47

## 2025-07-05 RX ADMIN — OCTREOTIDE ACETATE 10 MICROGRAM(S)/HR: 500 INJECTION, SOLUTION INTRAVENOUS; SUBCUTANEOUS at 13:58

## 2025-07-05 RX ADMIN — Medication 1 TABLET(S): at 11:36

## 2025-07-05 RX ADMIN — Medication 1 APPLICATION(S): at 05:24

## 2025-07-05 RX ADMIN — Medication 1 PACKET(S): at 08:39

## 2025-07-06 VITALS
SYSTOLIC BLOOD PRESSURE: 107 MMHG | HEART RATE: 58 BPM | DIASTOLIC BLOOD PRESSURE: 69 MMHG | OXYGEN SATURATION: 98 % | TEMPERATURE: 98 F | RESPIRATION RATE: 18 BRPM

## 2025-07-06 LAB
BILIRUB SERPL-MCNC: 0.9 MG/DL — SIGNIFICANT CHANGE UP (ref 0.2–1.2)
CREAT SERPL-MCNC: 0.75 MG/DL — SIGNIFICANT CHANGE UP (ref 0.5–1.3)
EGFR: 115 ML/MIN/1.73M2 — SIGNIFICANT CHANGE UP
EGFR: 115 ML/MIN/1.73M2 — SIGNIFICANT CHANGE UP
INR BLD: 1.1 — SIGNIFICANT CHANGE UP (ref 0.85–1.16)
MELD SCORE WITH DIALYSIS: 21 POINTS — SIGNIFICANT CHANGE UP
MELD SCORE WITHOUT DIALYSIS: 7 POINTS — SIGNIFICANT CHANGE UP
MITOCHONDRIA AB SER-ACNC: SIGNIFICANT CHANGE UP
PROTHROM AB SERPL-ACNC: 12.6 SEC — SIGNIFICANT CHANGE UP (ref 9.9–13.4)
SMOOTH MUSCLE AB SER-ACNC: SIGNIFICANT CHANGE UP
SODIUM SERPL-SCNC: 140 MMOL/L — SIGNIFICANT CHANGE UP (ref 135–145)

## 2025-07-06 PROCEDURE — 86704 HEP B CORE ANTIBODY TOTAL: CPT

## 2025-07-06 PROCEDURE — 83605 ASSAY OF LACTIC ACID: CPT

## 2025-07-06 PROCEDURE — 82784 ASSAY IGA/IGD/IGG/IGM EACH: CPT

## 2025-07-06 PROCEDURE — 80307 DRUG TEST PRSMV CHEM ANLYZR: CPT

## 2025-07-06 PROCEDURE — 86255 FLUORESCENT ANTIBODY SCREEN: CPT

## 2025-07-06 PROCEDURE — 71045 X-RAY EXAM CHEST 1 VIEW: CPT

## 2025-07-06 PROCEDURE — 80321 ALCOHOLS BIOMARKERS 1OR 2: CPT

## 2025-07-06 PROCEDURE — 82728 ASSAY OF FERRITIN: CPT

## 2025-07-06 PROCEDURE — 84295 ASSAY OF SERUM SODIUM: CPT

## 2025-07-06 PROCEDURE — 87040 BLOOD CULTURE FOR BACTERIA: CPT

## 2025-07-06 PROCEDURE — 83520 IMMUNOASSAY QUANT NOS NONAB: CPT

## 2025-07-06 PROCEDURE — 87340 HEPATITIS B SURFACE AG IA: CPT

## 2025-07-06 PROCEDURE — 86920 COMPATIBILITY TEST SPIN: CPT

## 2025-07-06 PROCEDURE — 85025 COMPLETE CBC W/AUTO DIFF WBC: CPT

## 2025-07-06 PROCEDURE — 80053 COMPREHEN METABOLIC PANEL: CPT

## 2025-07-06 PROCEDURE — 84100 ASSAY OF PHOSPHORUS: CPT

## 2025-07-06 PROCEDURE — 82565 ASSAY OF CREATININE: CPT

## 2025-07-06 PROCEDURE — 99285 EMERGENCY DEPT VISIT HI MDM: CPT

## 2025-07-06 PROCEDURE — 86706 HEP B SURFACE ANTIBODY: CPT

## 2025-07-06 PROCEDURE — 83521 IG LIGHT CHAINS FREE EACH: CPT

## 2025-07-06 PROCEDURE — 86708 HEPATITIS A ANTIBODY: CPT

## 2025-07-06 PROCEDURE — 86381 MITOCHONDRIAL ANTIBODY EACH: CPT

## 2025-07-06 PROCEDURE — 82390 ASSAY OF CERULOPLASMIN: CPT

## 2025-07-06 PROCEDURE — 85027 COMPLETE CBC AUTOMATED: CPT

## 2025-07-06 PROCEDURE — 83690 ASSAY OF LIPASE: CPT

## 2025-07-06 PROCEDURE — 96374 THER/PROPH/DIAG INJ IV PUSH: CPT

## 2025-07-06 PROCEDURE — 86901 BLOOD TYPING SEROLOGIC RH(D): CPT

## 2025-07-06 PROCEDURE — 82247 BILIRUBIN TOTAL: CPT

## 2025-07-06 PROCEDURE — 85730 THROMBOPLASTIN TIME PARTIAL: CPT

## 2025-07-06 PROCEDURE — 83540 ASSAY OF IRON: CPT

## 2025-07-06 PROCEDURE — 86803 HEPATITIS C AB TEST: CPT

## 2025-07-06 PROCEDURE — 82248 BILIRUBIN DIRECT: CPT

## 2025-07-06 PROCEDURE — 83735 ASSAY OF MAGNESIUM: CPT

## 2025-07-06 PROCEDURE — 86900 BLOOD TYPING SEROLOGIC ABO: CPT

## 2025-07-06 PROCEDURE — 36415 COLL VENOUS BLD VENIPUNCTURE: CPT

## 2025-07-06 PROCEDURE — 85610 PROTHROMBIN TIME: CPT

## 2025-07-06 PROCEDURE — 86038 ANTINUCLEAR ANTIBODIES: CPT

## 2025-07-06 PROCEDURE — 85384 FIBRINOGEN ACTIVITY: CPT

## 2025-07-06 PROCEDURE — 74177 CT ABD & PELVIS W/CONTRAST: CPT

## 2025-07-06 PROCEDURE — 86376 MICROSOMAL ANTIBODY EACH: CPT

## 2025-07-06 PROCEDURE — 83550 IRON BINDING TEST: CPT

## 2025-07-06 PROCEDURE — 36430 TRANSFUSION BLD/BLD COMPNT: CPT

## 2025-07-06 PROCEDURE — 82103 ALPHA-1-ANTITRYPSIN TOTAL: CPT

## 2025-07-06 PROCEDURE — 96375 TX/PRO/DX INJ NEW DRUG ADDON: CPT

## 2025-07-06 PROCEDURE — 86850 RBC ANTIBODY SCREEN: CPT

## 2025-07-06 PROCEDURE — 80048 BASIC METABOLIC PNL TOTAL CA: CPT

## 2025-07-06 PROCEDURE — P9016: CPT

## 2025-07-06 RX ORDER — CEFPODOXIME PROXETIL 200 MG/1
1 TABLET, FILM COATED ORAL
Qty: 4 | Refills: 0
Start: 2025-07-06 | End: 2025-07-07

## 2025-07-06 RX ADMIN — Medication 40 MILLIGRAM(S): at 06:39

## 2025-07-06 RX ADMIN — OCTREOTIDE ACETATE 10 MICROGRAM(S)/HR: 500 INJECTION, SOLUTION INTRAVENOUS; SUBCUTANEOUS at 01:36

## 2025-07-08 LAB
ANA TITR SER: NEGATIVE — SIGNIFICANT CHANGE UP
LKM AB SER-ACNC: <20.1 UNITS — SIGNIFICANT CHANGE UP (ref 0–20)
SOLUBLE LIVER IGG SER IA-ACNC: 1.5 — SIGNIFICANT CHANGE UP (ref 0–20)

## 2025-07-09 LAB
CULTURE RESULTS: SIGNIFICANT CHANGE UP
CULTURE RESULTS: SIGNIFICANT CHANGE UP
PETH 16:0/18:1: 32 NG/ML — HIGH
PETH 16:0/18:2: 33 NG/ML — HIGH
PETH COMMENTS: SIGNIFICANT CHANGE UP
SPECIMEN SOURCE: SIGNIFICANT CHANGE UP
SPECIMEN SOURCE: SIGNIFICANT CHANGE UP

## 2025-07-14 DIAGNOSIS — Z88.5 ALLERGY STATUS TO NARCOTIC AGENT: ICD-10-CM

## 2025-07-14 DIAGNOSIS — F10.10 ALCOHOL ABUSE, UNCOMPLICATED: ICD-10-CM

## 2025-07-14 DIAGNOSIS — D62 ACUTE POSTHEMORRHAGIC ANEMIA: ICD-10-CM

## 2025-07-14 DIAGNOSIS — I95.9 HYPOTENSION, UNSPECIFIED: ICD-10-CM
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
## 2025-07-14 DIAGNOSIS — Z53.29 PROCEDURE AND TREATMENT NOT CARRIED OUT BECAUSE OF PATIENT'S DECISION FOR OTHER REASONS: ICD-10-CM

## 2025-07-14 DIAGNOSIS — R16.1 SPLENOMEGALY, NOT ELSEWHERE CLASSIFIED: ICD-10-CM

## 2025-07-14 DIAGNOSIS — I86.4 GASTRIC VARICES: ICD-10-CM

## 2025-07-14 DIAGNOSIS — K74.60 UNSPECIFIED CIRRHOSIS OF LIVER: ICD-10-CM

## 2025-07-14 DIAGNOSIS — K76.6 PORTAL HYPERTENSION: ICD-10-CM

## 2025-07-14 DIAGNOSIS — K29.70 GASTRITIS, UNSPECIFIED, WITHOUT BLEEDING: ICD-10-CM

## 2025-07-14 DIAGNOSIS — D69.6 THROMBOCYTOPENIA, UNSPECIFIED: ICD-10-CM

## 2025-07-14 DIAGNOSIS — F17.200 NICOTINE DEPENDENCE, UNSPECIFIED, UNCOMPLICATED: ICD-10-CM

## 2025-07-14 DIAGNOSIS — I85.11 SECONDARY ESOPHAGEAL VARICES WITH BLEEDING: ICD-10-CM

## 2025-07-14 DIAGNOSIS — K92.2 GASTROINTESTINAL HEMORRHAGE, UNSPECIFIED: ICD-10-CM
